# Patient Record
Sex: FEMALE | Race: WHITE | NOT HISPANIC OR LATINO | Employment: OTHER | URBAN - METROPOLITAN AREA
[De-identification: names, ages, dates, MRNs, and addresses within clinical notes are randomized per-mention and may not be internally consistent; named-entity substitution may affect disease eponyms.]

---

## 2017-03-13 ENCOUNTER — COMMUNICATION - HEALTHEAST (OUTPATIENT)
Dept: FAMILY MEDICINE | Facility: CLINIC | Age: 67
End: 2017-03-13

## 2017-03-13 DIAGNOSIS — I10 BENIGN ESSENTIAL HYPERTENSION: ICD-10-CM

## 2017-05-24 ENCOUNTER — COMMUNICATION - HEALTHEAST (OUTPATIENT)
Dept: FAMILY MEDICINE | Facility: CLINIC | Age: 67
End: 2017-05-24

## 2017-05-24 DIAGNOSIS — I10 ESSENTIAL HYPERTENSION, BENIGN: ICD-10-CM

## 2017-05-24 DIAGNOSIS — E78.2 MIXED HYPERLIPIDEMIA: ICD-10-CM

## 2017-06-05 ENCOUNTER — AMBULATORY - HEALTHEAST (OUTPATIENT)
Dept: LAB | Facility: CLINIC | Age: 67
End: 2017-06-05

## 2017-06-05 DIAGNOSIS — I10 ESSENTIAL HYPERTENSION, BENIGN: ICD-10-CM

## 2017-06-05 DIAGNOSIS — E78.2 MIXED HYPERLIPIDEMIA: ICD-10-CM

## 2017-06-05 LAB
CHOLEST SERPL-MCNC: 242 MG/DL
FASTING STATUS PATIENT QL REPORTED: YES
HDLC SERPL-MCNC: 52 MG/DL
LDLC SERPL CALC-MCNC: 163 MG/DL
TRIGL SERPL-MCNC: 134 MG/DL

## 2017-06-14 ENCOUNTER — COMMUNICATION - HEALTHEAST (OUTPATIENT)
Dept: FAMILY MEDICINE | Facility: CLINIC | Age: 67
End: 2017-06-14

## 2017-06-14 ENCOUNTER — OFFICE VISIT - HEALTHEAST (OUTPATIENT)
Dept: FAMILY MEDICINE | Facility: CLINIC | Age: 67
End: 2017-06-14

## 2017-06-14 DIAGNOSIS — I10 BENIGN ESSENTIAL HYPERTENSION: ICD-10-CM

## 2017-06-14 DIAGNOSIS — Z12.31 VISIT FOR SCREENING MAMMOGRAM: ICD-10-CM

## 2017-06-14 DIAGNOSIS — E78.2 MIXED HYPERLIPIDEMIA: ICD-10-CM

## 2017-06-14 DIAGNOSIS — Z00.00 ROUTINE GENERAL MEDICAL EXAMINATION AT A HEALTH CARE FACILITY: ICD-10-CM

## 2017-06-14 RX ORDER — ESTRADIOL AND NORETHINDRONE ACETATE 1; .5 MG/1; MG/1
0.5 TABLET ORAL AT BEDTIME
Status: SHIPPED | COMMUNITY
Start: 2017-06-14

## 2017-06-14 ASSESSMENT — MIFFLIN-ST. JEOR: SCORE: 1224.61

## 2017-08-21 ENCOUNTER — RECORDS - HEALTHEAST (OUTPATIENT)
Dept: ADMINISTRATIVE | Facility: OTHER | Age: 67
End: 2017-08-21

## 2018-06-05 ENCOUNTER — COMMUNICATION - HEALTHEAST (OUTPATIENT)
Dept: FAMILY MEDICINE | Facility: CLINIC | Age: 68
End: 2018-06-05

## 2018-06-05 DIAGNOSIS — I10 ESSENTIAL HYPERTENSION, BENIGN: ICD-10-CM

## 2018-06-05 DIAGNOSIS — E78.2 MIXED HYPERLIPIDEMIA: ICD-10-CM

## 2018-07-18 ENCOUNTER — COMMUNICATION - HEALTHEAST (OUTPATIENT)
Dept: FAMILY MEDICINE | Facility: CLINIC | Age: 68
End: 2018-07-18

## 2018-08-10 ENCOUNTER — OFFICE VISIT - HEALTHEAST (OUTPATIENT)
Dept: FAMILY MEDICINE | Facility: CLINIC | Age: 68
End: 2018-08-10

## 2018-08-10 DIAGNOSIS — N95.9 MENOPAUSAL AND PERIMENOPAUSAL DISORDER: ICD-10-CM

## 2018-08-10 DIAGNOSIS — M19.041 PRIMARY OSTEOARTHRITIS OF BOTH HANDS: ICD-10-CM

## 2018-08-10 DIAGNOSIS — E78.2 MIXED HYPERLIPIDEMIA: ICD-10-CM

## 2018-08-10 DIAGNOSIS — M19.042 PRIMARY OSTEOARTHRITIS OF BOTH HANDS: ICD-10-CM

## 2018-08-10 DIAGNOSIS — L72.3 SEBACEOUS CYST: ICD-10-CM

## 2018-08-10 DIAGNOSIS — Z00.00 ROUTINE GENERAL MEDICAL EXAMINATION AT A HEALTH CARE FACILITY: ICD-10-CM

## 2018-08-10 DIAGNOSIS — Z12.31 VISIT FOR SCREENING MAMMOGRAM: ICD-10-CM

## 2018-08-10 LAB
ALBUMIN SERPL-MCNC: 4.2 G/DL (ref 3.5–5)
ALP SERPL-CCNC: 61 U/L (ref 45–120)
ALT SERPL W P-5'-P-CCNC: <9 U/L (ref 0–45)
ANION GAP SERPL CALCULATED.3IONS-SCNC: 11 MMOL/L (ref 5–18)
AST SERPL W P-5'-P-CCNC: 15 U/L (ref 0–40)
BILIRUB SERPL-MCNC: 0.8 MG/DL (ref 0–1)
BUN SERPL-MCNC: 13 MG/DL (ref 8–28)
CALCIUM SERPL-MCNC: 10 MG/DL (ref 8.5–10.5)
CHLORIDE BLD-SCNC: 107 MMOL/L (ref 98–107)
CHOLEST SERPL-MCNC: 251 MG/DL
CO2 SERPL-SCNC: 23 MMOL/L (ref 22–31)
CREAT SERPL-MCNC: 0.79 MG/DL (ref 0.6–1.1)
FASTING STATUS PATIENT QL REPORTED: YES
GFR SERPL CREATININE-BSD FRML MDRD: >60 ML/MIN/1.73M2
GLUCOSE BLD-MCNC: 86 MG/DL (ref 70–125)
HDLC SERPL-MCNC: 60 MG/DL
LDLC SERPL CALC-MCNC: 166 MG/DL
POTASSIUM BLD-SCNC: 4.9 MMOL/L (ref 3.5–5)
PROT SERPL-MCNC: 6.8 G/DL (ref 6–8)
SODIUM SERPL-SCNC: 141 MMOL/L (ref 136–145)
TRIGL SERPL-MCNC: 126 MG/DL

## 2018-08-10 ASSESSMENT — MIFFLIN-ST. JEOR: SCORE: 1177.09

## 2018-08-24 ENCOUNTER — RECORDS - HEALTHEAST (OUTPATIENT)
Dept: ADMINISTRATIVE | Facility: OTHER | Age: 68
End: 2018-08-24

## 2018-08-28 ENCOUNTER — RECORDS - HEALTHEAST (OUTPATIENT)
Dept: ADMINISTRATIVE | Facility: OTHER | Age: 68
End: 2018-08-28

## 2018-08-28 ENCOUNTER — RECORDS - HEALTHEAST (OUTPATIENT)
Dept: BONE DENSITY | Facility: CLINIC | Age: 68
End: 2018-08-28

## 2018-08-28 DIAGNOSIS — N95.9 UNSPECIFIED MENOPAUSAL AND PERIMENOPAUSAL DISORDER: ICD-10-CM

## 2018-09-18 ENCOUNTER — RECORDS - HEALTHEAST (OUTPATIENT)
Dept: ADMINISTRATIVE | Facility: OTHER | Age: 68
End: 2018-09-18

## 2019-09-09 ENCOUNTER — COMMUNICATION - HEALTHEAST (OUTPATIENT)
Dept: FAMILY MEDICINE | Facility: CLINIC | Age: 69
End: 2019-09-09

## 2019-09-09 ENCOUNTER — OFFICE VISIT - HEALTHEAST (OUTPATIENT)
Dept: FAMILY MEDICINE | Facility: CLINIC | Age: 69
End: 2019-09-09

## 2019-09-09 DIAGNOSIS — G47.09 OTHER INSOMNIA: ICD-10-CM

## 2019-09-09 DIAGNOSIS — Z87.891 PERSONAL HISTORY OF NICOTINE DEPENDENCE: ICD-10-CM

## 2019-09-09 DIAGNOSIS — Z72.0 TOBACCO USE: ICD-10-CM

## 2019-09-09 DIAGNOSIS — E78.2 MIXED HYPERLIPIDEMIA: ICD-10-CM

## 2019-09-09 DIAGNOSIS — Z12.31 VISIT FOR SCREENING MAMMOGRAM: ICD-10-CM

## 2019-09-09 DIAGNOSIS — Z00.00 ROUTINE GENERAL MEDICAL EXAMINATION AT A HEALTH CARE FACILITY: ICD-10-CM

## 2019-09-09 DIAGNOSIS — Z13.1 SCREENING FOR DIABETES MELLITUS: ICD-10-CM

## 2019-09-09 LAB
CHOLEST SERPL-MCNC: 280 MG/DL
FASTING STATUS PATIENT QL REPORTED: YES
FASTING STATUS PATIENT QL REPORTED: YES
GLUCOSE BLD-MCNC: 91 MG/DL (ref 70–99)
HDLC SERPL-MCNC: 61 MG/DL
LDLC SERPL CALC-MCNC: 174 MG/DL
TRIGL SERPL-MCNC: 225 MG/DL

## 2019-09-09 ASSESSMENT — MIFFLIN-ST. JEOR: SCORE: 1220.49

## 2019-09-12 ENCOUNTER — COMMUNICATION - HEALTHEAST (OUTPATIENT)
Dept: FAMILY MEDICINE | Facility: CLINIC | Age: 69
End: 2019-09-12

## 2019-09-13 ENCOUNTER — COMMUNICATION - HEALTHEAST (OUTPATIENT)
Dept: FAMILY MEDICINE | Facility: CLINIC | Age: 69
End: 2019-09-13

## 2019-09-17 ENCOUNTER — HOSPITAL ENCOUNTER (OUTPATIENT)
Dept: CT IMAGING | Facility: HOSPITAL | Age: 69
Discharge: HOME | End: 2019-09-17
Attending: FAMILY MEDICINE

## 2019-09-17 DIAGNOSIS — Z72.0 TOBACCO USE: ICD-10-CM

## 2019-09-17 DIAGNOSIS — Z87.891 PERSONAL HISTORY OF NICOTINE DEPENDENCE: ICD-10-CM

## 2019-09-18 ENCOUNTER — COMMUNICATION - HEALTHEAST (OUTPATIENT)
Dept: FAMILY MEDICINE | Facility: CLINIC | Age: 69
End: 2019-09-18

## 2019-09-18 DIAGNOSIS — R91.8 ABNORMAL CT LUNG SCREENING: ICD-10-CM

## 2019-09-19 ENCOUNTER — AMBULATORY - HEALTHEAST (OUTPATIENT)
Dept: FAMILY MEDICINE | Facility: CLINIC | Age: 69
End: 2019-09-19

## 2019-09-19 ENCOUNTER — COMMUNICATION - HEALTHEAST (OUTPATIENT)
Dept: PULMONOLOGY | Facility: OTHER | Age: 69
End: 2019-09-19

## 2019-09-19 ENCOUNTER — AMBULATORY - HEALTHEAST (OUTPATIENT)
Dept: PULMONOLOGY | Facility: OTHER | Age: 69
End: 2019-09-19

## 2019-09-19 DIAGNOSIS — R91.8 PULMONARY NODULES: ICD-10-CM

## 2019-09-20 ENCOUNTER — AMBULATORY - HEALTHEAST (OUTPATIENT)
Dept: FAMILY MEDICINE | Facility: CLINIC | Age: 69
End: 2019-09-20

## 2019-09-20 DIAGNOSIS — R91.8 ABNORMAL CT LUNG SCREENING: ICD-10-CM

## 2019-09-23 ENCOUNTER — AMBULATORY - HEALTHEAST (OUTPATIENT)
Dept: PULMONOLOGY | Facility: OTHER | Age: 69
End: 2019-09-23

## 2019-09-23 ENCOUNTER — OFFICE VISIT - HEALTHEAST (OUTPATIENT)
Dept: PULMONOLOGY | Facility: OTHER | Age: 69
End: 2019-09-23

## 2019-09-23 DIAGNOSIS — F43.22 ADJUSTMENT DISORDER WITH ANXIOUS MOOD: ICD-10-CM

## 2019-09-23 DIAGNOSIS — F41.9 ANXIETY: ICD-10-CM

## 2019-09-23 ASSESSMENT — MIFFLIN-ST. JEOR: SCORE: 1218.49

## 2019-10-10 ENCOUNTER — HOSPITAL ENCOUNTER (OUTPATIENT)
Dept: PET IMAGING | Facility: HOSPITAL | Age: 69
Discharge: HOME | End: 2019-10-10
Attending: INTERNAL MEDICINE

## 2019-10-10 ENCOUNTER — AMBULATORY - HEALTHEAST (OUTPATIENT)
Dept: PULMONOLOGY | Facility: OTHER | Age: 69
End: 2019-10-10

## 2019-10-10 DIAGNOSIS — R91.8 ABNORMAL CT LUNG SCREENING: ICD-10-CM

## 2019-10-10 DIAGNOSIS — R91.8 PULMONARY NODULES: ICD-10-CM

## 2019-10-10 LAB — GLUCOSE BLDC GLUCOMTR-MCNC: 107 MG/DL (ref 70–139)

## 2019-10-14 ENCOUNTER — AMBULATORY - HEALTHEAST (OUTPATIENT)
Dept: PULMONOLOGY | Facility: OTHER | Age: 69
End: 2019-10-14

## 2019-10-14 ENCOUNTER — COMMUNICATION - HEALTHEAST (OUTPATIENT)
Dept: FAMILY MEDICINE | Facility: CLINIC | Age: 69
End: 2019-10-14

## 2019-10-14 DIAGNOSIS — R91.8 PULMONARY NODULES: ICD-10-CM

## 2019-10-24 ENCOUNTER — OFFICE VISIT - HEALTHEAST (OUTPATIENT)
Dept: FAMILY MEDICINE | Facility: CLINIC | Age: 69
End: 2019-10-24

## 2019-10-24 DIAGNOSIS — R91.8 PULMONARY NODULES: ICD-10-CM

## 2019-10-24 DIAGNOSIS — R94.31 NONSPECIFIC ABNORMAL ELECTROCARDIOGRAM (ECG) (EKG): ICD-10-CM

## 2019-10-24 DIAGNOSIS — Z01.818 PREOPERATIVE EXAMINATION: ICD-10-CM

## 2019-10-24 DIAGNOSIS — E78.5 HYPERLIPIDEMIA LDL GOAL <130: ICD-10-CM

## 2019-10-24 LAB
ALBUMIN SERPL-MCNC: 3.9 G/DL (ref 3.5–5)
ALP SERPL-CCNC: 71 U/L (ref 45–120)
ALT SERPL W P-5'-P-CCNC: <9 U/L (ref 0–45)
ANION GAP SERPL CALCULATED.3IONS-SCNC: 7 MMOL/L (ref 5–18)
AST SERPL W P-5'-P-CCNC: 13 U/L (ref 0–40)
BASOPHILS # BLD AUTO: 0 THOU/UL (ref 0–0.2)
BASOPHILS NFR BLD AUTO: 0 % (ref 0–2)
BILIRUB SERPL-MCNC: 0.5 MG/DL (ref 0–1)
BUN SERPL-MCNC: 12 MG/DL (ref 8–28)
CALCIUM SERPL-MCNC: 10.1 MG/DL (ref 8.5–10.5)
CHLORIDE BLD-SCNC: 107 MMOL/L (ref 98–107)
CO2 SERPL-SCNC: 28 MMOL/L (ref 22–31)
CREAT SERPL-MCNC: 0.79 MG/DL (ref 0.6–1.1)
EOSINOPHIL # BLD AUTO: 0.3 THOU/UL (ref 0–0.4)
EOSINOPHIL NFR BLD AUTO: 3 % (ref 0–6)
ERYTHROCYTE [DISTWIDTH] IN BLOOD BY AUTOMATED COUNT: 13.4 % (ref 11–14.5)
GFR SERPL CREATININE-BSD FRML MDRD: >60 ML/MIN/1.73M2
GLUCOSE BLD-MCNC: 104 MG/DL (ref 70–125)
HCT VFR BLD AUTO: 44.8 % (ref 35–47)
HGB BLD-MCNC: 14.8 G/DL (ref 12–16)
LYMPHOCYTES # BLD AUTO: 2.9 THOU/UL (ref 0.8–4.4)
LYMPHOCYTES NFR BLD AUTO: 28 % (ref 20–40)
MCH RBC QN AUTO: 31.4 PG (ref 27–34)
MCHC RBC AUTO-ENTMCNC: 33 G/DL (ref 32–36)
MCV RBC AUTO: 95 FL (ref 80–100)
MONOCYTES # BLD AUTO: 0.7 THOU/UL (ref 0–0.9)
MONOCYTES NFR BLD AUTO: 7 % (ref 2–10)
NEUTROPHILS # BLD AUTO: 6.2 THOU/UL (ref 2–7.7)
NEUTROPHILS NFR BLD AUTO: 61 % (ref 50–70)
PLATELET # BLD AUTO: 211 THOU/UL (ref 140–440)
PMV BLD AUTO: 11.6 FL (ref 8.5–12.5)
POTASSIUM BLD-SCNC: 4.5 MMOL/L (ref 3.5–5)
PROT SERPL-MCNC: 6.6 G/DL (ref 6–8)
RBC # BLD AUTO: 4.71 MILL/UL (ref 3.8–5.4)
SODIUM SERPL-SCNC: 142 MMOL/L (ref 136–145)
WBC: 10.1 THOU/UL (ref 4–11)

## 2019-10-24 ASSESSMENT — MIFFLIN-ST. JEOR: SCORE: 1225.29

## 2019-10-25 ENCOUNTER — COMMUNICATION - HEALTHEAST (OUTPATIENT)
Dept: FAMILY MEDICINE | Facility: CLINIC | Age: 69
End: 2019-10-25

## 2019-10-25 ENCOUNTER — HOSPITAL ENCOUNTER (OUTPATIENT)
Dept: CARDIOLOGY | Facility: CLINIC | Age: 69
Discharge: HOME | End: 2019-10-25
Attending: FAMILY MEDICINE

## 2019-10-25 DIAGNOSIS — R94.31 NONSPECIFIC ABNORMAL ELECTROCARDIOGRAM (ECG) (EKG): ICD-10-CM

## 2019-10-25 LAB
ATRIAL RATE - MUSE: 59 BPM
CV STRESS CURRENT BP HE: NORMAL
CV STRESS CURRENT HR HE: 103
CV STRESS CURRENT HR HE: 103
CV STRESS CURRENT HR HE: 105
CV STRESS CURRENT HR HE: 106
CV STRESS CURRENT HR HE: 107
CV STRESS CURRENT HR HE: 114
CV STRESS CURRENT HR HE: 115
CV STRESS CURRENT HR HE: 119
CV STRESS CURRENT HR HE: 121
CV STRESS CURRENT HR HE: 129
CV STRESS CURRENT HR HE: 129
CV STRESS CURRENT HR HE: 70
CV STRESS CURRENT HR HE: 70
CV STRESS CURRENT HR HE: 71
CV STRESS CURRENT HR HE: 72
CV STRESS CURRENT HR HE: 72
CV STRESS CURRENT HR HE: 73
CV STRESS CURRENT HR HE: 74
CV STRESS CURRENT HR HE: 77
CV STRESS CURRENT HR HE: 81
CV STRESS CURRENT HR HE: 83
CV STRESS CURRENT HR HE: 85
CV STRESS CURRENT HR HE: 92
CV STRESS CURRENT HR HE: 92
CV STRESS CURRENT HR HE: 99
CV STRESS DEVIATION TIME HE: NORMAL
CV STRESS ECHO PERCENT HR HE: NORMAL
CV STRESS EXERCISE STAGE HE: NORMAL
CV STRESS FINAL RESTING BP HE: NORMAL
CV STRESS FINAL RESTING HR HE: 74
CV STRESS MAX HR HE: 129
CV STRESS MAX TREADMILL GRADE HE: 14
CV STRESS MAX TREADMILL SPEED HE: 3.4
CV STRESS PEAK DIA BP HE: NORMAL
CV STRESS PEAK SYS BP HE: NORMAL
CV STRESS PHASE HE: NORMAL
CV STRESS PROTOCOL HE: NORMAL
CV STRESS RESTING PT POSITION HE: NORMAL
CV STRESS ST DEVIATION AMOUNT HE: NORMAL
CV STRESS ST DEVIATION ELEVATION HE: NORMAL
CV STRESS ST EVELATION AMOUNT HE: NORMAL
CV STRESS TEST TYPE HE: NORMAL
CV STRESS TOTAL STAGE TIME MIN 1 HE: NORMAL
DIASTOLIC BLOOD PRESSURE - MUSE: NORMAL
ECHO EJECTION FRACTION ESTIMATED: 65 %
INTERPRETATION ECG - MUSE: NORMAL
P AXIS - MUSE: 75 DEGREES
PR INTERVAL - MUSE: 178 MS
QRS DURATION - MUSE: 80 MS
QT - MUSE: 428 MS
QTC - MUSE: 423 MS
R AXIS - MUSE: 26 DEGREES
RATE PRESSURE PRODUCT: NORMAL
STRESS ECHO BASELINE BP: NORMAL
STRESS ECHO BASELINE DIASTOLIC HE: 70
STRESS ECHO BASELINE HR: 81
STRESS ECHO BASELINE SYSTOLIC BP: 124
STRESS ECHO CALCULATED PERCENT HR: 87 %
STRESS ECHO LAST STRESS BP: NORMAL
STRESS ECHO LAST STRESS DIASTOLIC BP: 70
STRESS ECHO LAST STRESS HR: 129
STRESS ECHO LAST STRESS SYSTOLIC BP: 192
STRESS ECHO POST ESTIMATED WORKLOAD: 8.7
STRESS ECHO POST EXERCISE DUR MIN: 7
STRESS ECHO POST EXERCISE DUR SEC: 0
STRESS ECHO TARGET HR: 149
SYSTOLIC BLOOD PRESSURE - MUSE: NORMAL
T AXIS - MUSE: 211 DEGREES
VENTRICULAR RATE- MUSE: 59 BPM

## 2019-10-28 ENCOUNTER — HOSPITAL ENCOUNTER (OUTPATIENT)
Dept: RADIOLOGY | Facility: CLINIC | Age: 69
Discharge: HOME | End: 2019-10-28
Attending: RADIOLOGY

## 2019-10-28 ENCOUNTER — HOSPITAL ENCOUNTER (OUTPATIENT)
Dept: CT IMAGING | Facility: CLINIC | Age: 69
Discharge: HOME | End: 2019-10-28
Attending: INTERNAL MEDICINE | Admitting: RADIOLOGY

## 2019-10-28 DIAGNOSIS — R91.8 PULMONARY NODULES: ICD-10-CM

## 2019-10-28 LAB — INR PPP: 1.01 (ref 0.9–1.1)

## 2019-10-28 ASSESSMENT — MIFFLIN-ST. JEOR: SCORE: 1218.25

## 2019-10-30 LAB
CAP COMMENT: ABNORMAL
LAB AP CHARGES (HE HISTORICAL CONVERSION): ABNORMAL
LAB AP INITIAL CYTO EVAL (HE HISTORICAL CONVERSION): ABNORMAL
LAB MED GENERAL PATH INTERP (HE HISTORICAL CONVERSION): ABNORMAL
PATH REPORT.COMMENTS IMP SPEC: ABNORMAL
PATH REPORT.COMMENTS IMP SPEC: ABNORMAL
PATH REPORT.FINAL DX SPEC: ABNORMAL
PATH REPORT.MICROSCOPIC SPEC OTHER STN: ABNORMAL
PATH REPORT.RELEVANT HX SPEC: ABNORMAL
SPECIMEN DESCRIPTION: ABNORMAL

## 2019-11-04 ENCOUNTER — AMBULATORY - HEALTHEAST (OUTPATIENT)
Dept: PULMONOLOGY | Facility: OTHER | Age: 69
End: 2019-11-04

## 2019-11-04 DIAGNOSIS — C34.31 MALIGNANT NEOPLASM OF LOWER LOBE OF RIGHT LUNG (H): ICD-10-CM

## 2019-11-06 ENCOUNTER — HOSPITAL ENCOUNTER (OUTPATIENT)
Dept: RESPIRATORY THERAPY | Facility: HOSPITAL | Age: 69
Discharge: HOME | End: 2019-11-06
Attending: INTERNAL MEDICINE

## 2019-11-06 DIAGNOSIS — C34.31 MALIGNANT NEOPLASM OF LOWER LOBE OF RIGHT LUNG (H): ICD-10-CM

## 2019-11-07 ENCOUNTER — OFFICE VISIT - HEALTHEAST (OUTPATIENT)
Dept: PULMONOLOGY | Facility: OTHER | Age: 69
End: 2019-11-07

## 2019-11-07 ENCOUNTER — SURGERY - HEALTHEAST (OUTPATIENT)
Dept: SURGERY | Facility: CLINIC | Age: 69
End: 2019-11-07

## 2019-11-07 DIAGNOSIS — C34.31 MALIGNANT NEOPLASM OF LOWER LOBE OF RIGHT LUNG (H): ICD-10-CM

## 2019-11-07 DIAGNOSIS — R91.1 LUNG NODULE: ICD-10-CM

## 2019-11-07 ASSESSMENT — MIFFLIN-ST. JEOR: SCORE: 1229.14

## 2019-11-08 ENCOUNTER — PREP FOR PROCEDURE (OUTPATIENT)
Dept: SURGERY | Facility: CLINIC | Age: 69
End: 2019-11-08

## 2019-11-08 DIAGNOSIS — C34.31 MALIGNANT NEOPLASM OF LOWER LOBE OF RIGHT LUNG (H): Primary | ICD-10-CM

## 2019-11-12 ENCOUNTER — COMMUNICATION - HEALTHEAST (OUTPATIENT)
Dept: FAMILY MEDICINE | Facility: CLINIC | Age: 69
End: 2019-11-12

## 2019-11-12 ENCOUNTER — TELEPHONE (OUTPATIENT)
Dept: SURGERY | Facility: CLINIC | Age: 69
End: 2019-11-12

## 2019-11-12 NOTE — TELEPHONE ENCOUNTER
Called patient to schedule surgery with Dr. Dumas,   Patient stated she will keep the surgery as scheduled in [facility].   Message was sent to her care team

## 2019-11-15 ENCOUNTER — SURGERY - HEALTHEAST (OUTPATIENT)
Dept: CARDIOLOGY | Facility: CLINIC | Age: 69
End: 2019-11-15

## 2019-11-25 ENCOUNTER — OFFICE VISIT - HEALTHEAST (OUTPATIENT)
Dept: PULMONOLOGY | Facility: OTHER | Age: 69
End: 2019-11-25

## 2019-11-25 DIAGNOSIS — C34.31 MALIGNANT NEOPLASM OF LOWER LOBE OF RIGHT LUNG (H): ICD-10-CM

## 2019-11-26 ENCOUNTER — OFFICE VISIT - HEALTHEAST (OUTPATIENT)
Dept: FAMILY MEDICINE | Facility: CLINIC | Age: 69
End: 2019-11-26

## 2019-11-26 DIAGNOSIS — C34.31 MALIGNANT NEOPLASM OF LOWER LOBE OF RIGHT LUNG (H): ICD-10-CM

## 2019-11-26 DIAGNOSIS — I10 HYPERTENSION, UNSPECIFIED TYPE: ICD-10-CM

## 2019-11-26 DIAGNOSIS — Z01.818 PRE-OPERATIVE GENERAL PHYSICAL EXAMINATION: ICD-10-CM

## 2019-11-26 LAB
ANION GAP SERPL CALCULATED.3IONS-SCNC: 8 MMOL/L (ref 5–18)
BUN SERPL-MCNC: 11 MG/DL (ref 8–28)
CALCIUM SERPL-MCNC: 9.3 MG/DL (ref 8.5–10.5)
CHLORIDE BLD-SCNC: 106 MMOL/L (ref 98–107)
CO2 SERPL-SCNC: 26 MMOL/L (ref 22–31)
CREAT SERPL-MCNC: 0.81 MG/DL (ref 0.6–1.1)
ERYTHROCYTE [DISTWIDTH] IN BLOOD BY AUTOMATED COUNT: 11.9 % (ref 11–14.5)
GFR SERPL CREATININE-BSD FRML MDRD: >60 ML/MIN/1.73M2
GLUCOSE BLD-MCNC: 92 MG/DL (ref 70–125)
HCT VFR BLD AUTO: 44.5 % (ref 35–47)
HGB BLD-MCNC: 15 G/DL (ref 12–16)
MCH RBC QN AUTO: 31.8 PG (ref 27–34)
MCHC RBC AUTO-ENTMCNC: 33.8 G/DL (ref 32–36)
MCV RBC AUTO: 94 FL (ref 80–100)
PLATELET # BLD AUTO: 176 THOU/UL (ref 140–440)
PMV BLD AUTO: 8.3 FL (ref 7–10)
POTASSIUM BLD-SCNC: 4 MMOL/L (ref 3.5–5)
RBC # BLD AUTO: 4.72 MILL/UL (ref 3.8–5.4)
SODIUM SERPL-SCNC: 140 MMOL/L (ref 136–145)
WBC: 9.4 THOU/UL (ref 4–11)

## 2019-11-26 ASSESSMENT — MIFFLIN-ST. JEOR: SCORE: 1232.66

## 2019-11-27 ENCOUNTER — COMMUNICATION - HEALTHEAST (OUTPATIENT)
Dept: FAMILY MEDICINE | Facility: CLINIC | Age: 69
End: 2019-11-27

## 2019-12-10 ENCOUNTER — SURGERY - HEALTHEAST (OUTPATIENT)
Dept: SURGERY | Facility: CLINIC | Age: 69
End: 2019-12-10

## 2019-12-10 ENCOUNTER — ANESTHESIA - HEALTHEAST (OUTPATIENT)
Dept: SURGERY | Facility: CLINIC | Age: 69
End: 2019-12-10

## 2019-12-10 ASSESSMENT — MIFFLIN-ST. JEOR: SCORE: 1220.75

## 2019-12-11 ASSESSMENT — MIFFLIN-ST. JEOR: SCORE: 1246.61

## 2019-12-12 ASSESSMENT — MIFFLIN-ST. JEOR
SCORE: 1246.5
SCORE: 1246.61

## 2019-12-13 ENCOUNTER — AMBULATORY - HEALTHEAST (OUTPATIENT)
Dept: PULMONOLOGY | Facility: OTHER | Age: 69
End: 2019-12-13

## 2019-12-13 DIAGNOSIS — C34.31 MALIGNANT NEOPLASM OF LOWER LOBE OF RIGHT LUNG (H): ICD-10-CM

## 2019-12-13 ASSESSMENT — MIFFLIN-ST. JEOR: SCORE: 1240.71

## 2019-12-14 ASSESSMENT — MIFFLIN-ST. JEOR: SCORE: 1233

## 2019-12-15 ASSESSMENT — MIFFLIN-ST. JEOR: SCORE: 1228.01

## 2019-12-16 ENCOUNTER — COMMUNICATION - HEALTHEAST (OUTPATIENT)
Dept: FAMILY MEDICINE | Facility: CLINIC | Age: 69
End: 2019-12-16

## 2019-12-16 ASSESSMENT — MIFFLIN-ST. JEOR: SCORE: 1223.47

## 2019-12-19 ENCOUNTER — OFFICE VISIT - HEALTHEAST (OUTPATIENT)
Dept: FAMILY MEDICINE | Facility: CLINIC | Age: 69
End: 2019-12-19

## 2019-12-19 ENCOUNTER — COMMUNICATION - HEALTHEAST (OUTPATIENT)
Dept: FAMILY MEDICINE | Facility: CLINIC | Age: 69
End: 2019-12-19

## 2019-12-19 DIAGNOSIS — I10 HYPERTENSION, UNSPECIFIED TYPE: ICD-10-CM

## 2019-12-19 DIAGNOSIS — Z98.890 S/P THORACOTOMY: ICD-10-CM

## 2019-12-19 ASSESSMENT — MIFFLIN-ST. JEOR: SCORE: 1202.61

## 2019-12-23 ENCOUNTER — OFFICE VISIT - HEALTHEAST (OUTPATIENT)
Dept: PULMONOLOGY | Facility: OTHER | Age: 69
End: 2019-12-23

## 2019-12-23 DIAGNOSIS — B40.9 BLASTOMYCOSIS: ICD-10-CM

## 2019-12-23 ASSESSMENT — MIFFLIN-ST. JEOR: SCORE: 1202.61

## 2020-01-16 ENCOUNTER — HOSPITAL ENCOUNTER (OUTPATIENT)
Dept: RADIOLOGY | Facility: HOSPITAL | Age: 70
Discharge: HOME | End: 2020-01-16
Attending: THORACIC SURGERY (CARDIOTHORACIC VASCULAR SURGERY)

## 2020-01-16 ENCOUNTER — OFFICE VISIT - HEALTHEAST (OUTPATIENT)
Dept: PULMONOLOGY | Facility: OTHER | Age: 70
End: 2020-01-16

## 2020-01-16 ENCOUNTER — COMMUNICATION - HEALTHEAST (OUTPATIENT)
Dept: FAMILY MEDICINE | Facility: CLINIC | Age: 70
End: 2020-01-16

## 2020-01-16 DIAGNOSIS — C34.31 MALIGNANT NEOPLASM OF LOWER LOBE OF RIGHT LUNG (H): ICD-10-CM

## 2020-01-16 DIAGNOSIS — J84.10 PULMONARY GRANULOMA (H): ICD-10-CM

## 2020-01-16 DIAGNOSIS — R91.8 PULMONARY NODULES: ICD-10-CM

## 2020-01-16 DIAGNOSIS — E78.5 HYPERLIPIDEMIA LDL GOAL <130: ICD-10-CM

## 2020-01-16 RX ORDER — METOPROLOL TARTRATE 25 MG/1
25 TABLET, FILM COATED ORAL 2 TIMES DAILY
Qty: 14 TABLET | Refills: 0 | Status: SHIPPED | OUTPATIENT
Start: 2020-01-16 | End: 2021-10-29

## 2020-01-29 ENCOUNTER — HOSPITAL ENCOUNTER (OUTPATIENT)
Dept: CT IMAGING | Facility: HOSPITAL | Age: 70
Discharge: HOME | End: 2020-01-29
Attending: INTERNAL MEDICINE

## 2020-01-29 DIAGNOSIS — B40.9 BLASTOMYCOSIS: ICD-10-CM

## 2020-02-04 ENCOUNTER — COMMUNICATION - HEALTHEAST (OUTPATIENT)
Dept: FAMILY MEDICINE | Facility: CLINIC | Age: 70
End: 2020-02-04

## 2020-02-04 DIAGNOSIS — G47.09 OTHER INSOMNIA: ICD-10-CM

## 2020-02-10 ENCOUNTER — OFFICE VISIT - HEALTHEAST (OUTPATIENT)
Dept: PULMONOLOGY | Facility: OTHER | Age: 70
End: 2020-02-10

## 2020-02-10 DIAGNOSIS — R05.9 COUGH: ICD-10-CM

## 2020-02-11 ENCOUNTER — COMMUNICATION - HEALTHEAST (OUTPATIENT)
Dept: SCHEDULING | Facility: CLINIC | Age: 70
End: 2020-02-11

## 2020-02-11 ENCOUNTER — COMMUNICATION - HEALTHEAST (OUTPATIENT)
Dept: FAMILY MEDICINE | Facility: CLINIC | Age: 70
End: 2020-02-11

## 2020-02-11 DIAGNOSIS — G47.09 OTHER INSOMNIA: ICD-10-CM

## 2020-02-19 ENCOUNTER — AMBULATORY - HEALTHEAST (OUTPATIENT)
Dept: PULMONOLOGY | Facility: OTHER | Age: 70
End: 2020-02-19

## 2020-02-19 ENCOUNTER — COMMUNICATION - HEALTHEAST (OUTPATIENT)
Dept: PULMONOLOGY | Facility: OTHER | Age: 70
End: 2020-02-19

## 2020-02-19 DIAGNOSIS — R05.9 COUGH: ICD-10-CM

## 2020-02-20 ENCOUNTER — HOSPITAL ENCOUNTER (OUTPATIENT)
Dept: RADIOLOGY | Facility: HOSPITAL | Age: 70
Discharge: HOME | End: 2020-02-20
Attending: INTERNAL MEDICINE

## 2020-02-20 ENCOUNTER — OFFICE VISIT - HEALTHEAST (OUTPATIENT)
Dept: PULMONOLOGY | Facility: OTHER | Age: 70
End: 2020-02-20

## 2020-02-20 DIAGNOSIS — R05.9 COUGH: ICD-10-CM

## 2020-02-20 DIAGNOSIS — J40 BRONCHITIS: ICD-10-CM

## 2020-03-05 ENCOUNTER — AMBULATORY - HEALTHEAST (OUTPATIENT)
Dept: PULMONOLOGY | Facility: OTHER | Age: 70
End: 2020-03-05

## 2020-03-05 ENCOUNTER — COMMUNICATION - HEALTHEAST (OUTPATIENT)
Dept: PULMONOLOGY | Facility: OTHER | Age: 70
End: 2020-03-05

## 2020-03-05 ENCOUNTER — COMMUNICATION - HEALTHEAST (OUTPATIENT)
Dept: SCHEDULING | Facility: CLINIC | Age: 70
End: 2020-03-05

## 2020-03-05 DIAGNOSIS — R05.9 COUGH: ICD-10-CM

## 2020-04-01 ENCOUNTER — COMMUNICATION - HEALTHEAST (OUTPATIENT)
Dept: FAMILY MEDICINE | Facility: CLINIC | Age: 70
End: 2020-04-01

## 2020-04-01 DIAGNOSIS — I10 HYPERTENSION, UNSPECIFIED TYPE: ICD-10-CM

## 2020-04-03 ENCOUNTER — COMMUNICATION - HEALTHEAST (OUTPATIENT)
Dept: FAMILY MEDICINE | Facility: CLINIC | Age: 70
End: 2020-04-03

## 2020-04-06 ENCOUNTER — AMBULATORY - HEALTHEAST (OUTPATIENT)
Dept: FAMILY MEDICINE | Facility: CLINIC | Age: 70
End: 2020-04-06

## 2020-04-06 DIAGNOSIS — I10 HYPERTENSION, UNSPECIFIED TYPE: ICD-10-CM

## 2020-04-10 ENCOUNTER — COMMUNICATION - HEALTHEAST (OUTPATIENT)
Dept: FAMILY MEDICINE | Facility: CLINIC | Age: 70
End: 2020-04-10

## 2020-04-10 DIAGNOSIS — I10 HYPERTENSION, UNSPECIFIED TYPE: ICD-10-CM

## 2020-05-13 ENCOUNTER — COMMUNICATION - HEALTHEAST (OUTPATIENT)
Dept: FAMILY MEDICINE | Facility: CLINIC | Age: 70
End: 2020-05-13

## 2020-05-13 DIAGNOSIS — I10 HYPERTENSION, UNSPECIFIED TYPE: ICD-10-CM

## 2020-05-13 RX ORDER — LISINOPRIL 20 MG/1
20 TABLET ORAL DAILY
Qty: 90 TABLET | Refills: 3 | Status: SHIPPED | OUTPATIENT
Start: 2020-05-13 | End: 2021-10-29 | Stop reason: DRUGHIGH

## 2020-05-14 ENCOUNTER — AMBULATORY - HEALTHEAST (OUTPATIENT)
Dept: LAB | Facility: CLINIC | Age: 70
End: 2020-05-14

## 2020-05-14 ENCOUNTER — COMMUNICATION - HEALTHEAST (OUTPATIENT)
Dept: FAMILY MEDICINE | Facility: CLINIC | Age: 70
End: 2020-05-14

## 2020-05-14 DIAGNOSIS — I10 HYPERTENSION, UNSPECIFIED TYPE: ICD-10-CM

## 2020-05-14 LAB
ANION GAP SERPL CALCULATED.3IONS-SCNC: 7 MMOL/L (ref 5–18)
BUN SERPL-MCNC: 12 MG/DL (ref 8–28)
CALCIUM SERPL-MCNC: 9.4 MG/DL (ref 8.5–10.5)
CHLORIDE BLD-SCNC: 107 MMOL/L (ref 98–107)
CO2 SERPL-SCNC: 27 MMOL/L (ref 22–31)
CREAT SERPL-MCNC: 0.82 MG/DL (ref 0.6–1.1)
GFR SERPL CREATININE-BSD FRML MDRD: >60 ML/MIN/1.73M2
GLUCOSE BLD-MCNC: 99 MG/DL (ref 70–125)
POTASSIUM BLD-SCNC: 4.4 MMOL/L (ref 3.5–5)
SODIUM SERPL-SCNC: 141 MMOL/L (ref 136–145)

## 2020-06-03 ENCOUNTER — COMMUNICATION - HEALTHEAST (OUTPATIENT)
Dept: FAMILY MEDICINE | Facility: CLINIC | Age: 70
End: 2020-06-03

## 2020-06-27 ENCOUNTER — COMMUNICATION - HEALTHEAST (OUTPATIENT)
Dept: FAMILY MEDICINE | Facility: CLINIC | Age: 70
End: 2020-06-27

## 2020-06-27 DIAGNOSIS — I10 HYPERTENSION, UNSPECIFIED TYPE: ICD-10-CM

## 2020-08-03 ENCOUNTER — OFFICE VISIT - HEALTHEAST (OUTPATIENT)
Dept: PULMONOLOGY | Facility: OTHER | Age: 70
End: 2020-08-03

## 2020-08-03 DIAGNOSIS — J43.2 CENTRILOBULAR EMPHYSEMA (H): ICD-10-CM

## 2020-08-24 ENCOUNTER — COMMUNICATION - HEALTHEAST (OUTPATIENT)
Dept: FAMILY MEDICINE | Facility: CLINIC | Age: 70
End: 2020-08-24

## 2020-08-24 DIAGNOSIS — G47.09 OTHER INSOMNIA: ICD-10-CM

## 2020-08-25 ENCOUNTER — RECORDS - HEALTHEAST (OUTPATIENT)
Dept: ADMINISTRATIVE | Facility: OTHER | Age: 70
End: 2020-08-25

## 2020-09-28 ENCOUNTER — AMBULATORY - HEALTHEAST (OUTPATIENT)
Dept: FAMILY MEDICINE | Facility: CLINIC | Age: 70
End: 2020-09-28

## 2020-09-28 ENCOUNTER — VIRTUAL VISIT (OUTPATIENT)
Dept: FAMILY MEDICINE | Facility: OTHER | Age: 70
End: 2020-09-28
Payer: COMMERCIAL

## 2020-09-28 DIAGNOSIS — Z20.822 SUSPECTED COVID-19 VIRUS INFECTION: ICD-10-CM

## 2020-09-28 PROCEDURE — 99421 OL DIG E/M SVC 5-10 MIN: CPT | Performed by: PHYSICIAN ASSISTANT

## 2020-09-28 NOTE — PROGRESS NOTES
"Date: 2020 12:22:01  Clinician: Diann Beltre  Clinician NPI: [id number]  Patient: Katey Singh  Patient : 1950  Patient Address: [address] [place] [address]  Patient Phone: [phone]  Visit Protocol: URI  Patient Summary:  Katey is a 70 year old ( : 1950 ) female who initiated a [facility] Visit for COVID-19 (Coronavirus) evaluation and screening. When asked the question \"Please sign me up to receive news, health information and promotions from [facility].\", Katey responded \"No\".    Katey states her symptoms started suddenly 3-4 days ago. After her symptoms started, they improved and then got worse again.   Her symptoms consist of nausea, myalgia, malaise, and diarrhea.   Katey denies having cough, nasal congestion, headache, ear pain, anosmia, vomiting, rhinitis, wheezing, enlarged lymph nodes, facial pain or pressure, fever, chills, sore throat, teeth pain, and ageusia. She also denies taking antibiotic medication in the past month and having recent facial or sinus surgery in the past 60 days. She is not experiencing dyspnea.   Precipitating events  She has not recently been exposed to someone with influenza. Katey has been in close contact with the following high risk individuals: adults 65 or older and people with asthma, heart disease or diabetes.   Pertinent COVID-19 (Coronavirus) information  In the past 14 days, Katey has not worked in a congregate living setting.   She does not work or volunteer as healthcare worker or a  and does not work or volunteer in a healthcare facility.   Katey also has not lived in a congregate living setting in the past 14 days. She does not live with a healthcare worker.   Katey has not had a close contact with a laboratory-confirmed COVID-19 patient within 14 days of symptom onset.   Since 2019, Katey and has had upper respiratory infection (URI) or influenza-like illness. Has not been diagnosed with " lab-confirmed COVID-19 test      Date(s) of previous URI or influenza-like illness (free-text): 09/25 thru 09/28/20     Symptoms Katey experienced during previous URI or influenza-like illness as reported by the patient (free-text): Diarrhea, nausea, aches with headache earlier but not now        Pertinent medical history  Katey does not get yeast infections when she takes antibiotics.   Katey does not need a return to work/school note.   Weight: 145 lbs   Katey does not smoke or use smokeless tobacco.   Weight: 145 lbs    MEDICATIONS: lisinopril-hydrochlorothiazide oral, ALLERGIES: NKDA  Clinician Response:  Dear Katey,   Your symptoms show that you may have coronavirus (COVID-19). This illness can cause fever, cough and trouble breathing. Many people get a mild case and get better on their own. Some people can get very sick.  What should I do?  We would like to test you for this virus.   1. Please call [phone] to schedule your visit. Explain that you were referred by [facility] to have a COVID-19 test. Be ready to share your [facility] visit ID number.  The following will serve as your written order for this COVID Test, ordered by me, for the indication of suspected COVID [Z20.828]: The test will be ordered in [employer], our electronic health record, after you are scheduled. It will show as ordered and authorized by Edilson Mckenna MD.  Order: COVID-19 (Coronavirus) PCR for SYMPTOMATIC testing from [facility].      2. When it's time for your COVID test:  Stay at least 6 feet away from others. (If someone will drive you to your test, stay in the backseat, as far away from the  as you can.)   Cover your mouth and nose with a mask, tissue or washcloth.  Go straight to the testing site. Don't make any stops on the way there or back.      3.Starting now: Stay home and away from others (self-isolate) until:   You've had no fever---and no medicine that reduces fever---for one full day (24 hours). And...   Your other  "symptoms have gotten better. For example, your cough or breathing has improved. And...   At least 10 days have passed since your symptoms started.       During this time, don't leave the house except for testing or medical care.   Stay in your own room, even for meals. Use your own bathroom if you can.   Stay away from others in your home. No hugging, kissing or shaking hands. No visitors.  Don't go to work, school or anywhere else.    Clean \"high touch\" surfaces often (doorknobs, counters, handles, etc.). Use a household cleaning spray or wipes. You'll find a full list of  on the EPA website: www.epa.gov/pesticide-registration/list-n-disinfectants-use-against-sars-cov-2.   Cover your mouth and nose with a mask, tissue or washcloth to avoid spreading germs.  Wash your hands and face often. Use soap and water.  Caregivers in these groups are at risk for severe illness due to COVID-19:  o People 65 years and older  o People who live in a nursing home or long-term care facility  o People with chronic disease (lung, heart, cancer, diabetes, kidney, liver, immunologic)  o People who have a weakened immune system, including those who:   Are in cancer treatment  Take medicine that weakens the immune system, such as corticosteroids  Had a bone marrow or organ transplant  Have an immune deficiency  Have poorly controlled HIV or AIDS  Are obese (body mass index of 40 or higher)  Smoke regularly   o Caregivers should wear gloves while washing dishes, handling laundry and cleaning bedrooms and bathrooms.  o Use caution when washing and drying laundry: Don't shake dirty laundry, and use the warmest water setting that you can.  o For more tips, go to www.cdc.gov/coronavirus/2019-ncov/downloads/10Things.pdf.    4.Sign up for Liz Mendoza. We know it's scary to hear that you might have COVID-19. We want to track your symptoms to make sure you're okay over the next 2 weeks. Please look for an email from Liz Mendoza---this " is a free, online program that we'll use to keep in touch. To sign up, follow the link in the email. Learn more at [email or web address]  How can I take care of myself?   Get lots of rest. Drink extra fluids (unless a doctor has told you not to).   Take Tylenol (acetaminophen) for fever or pain. If you have liver or kidney problems, ask your family doctor if it's okay to take Tylenol.   Adults can take either:    650 mg (two 325 mg pills) every 4 to 6 hours, or...   1,000 mg (two 500 mg pills) every 8 hours as needed.    Note: Don't take more than 3,000 mg in one day. Acetaminophen is found in many medicines (both prescribed and over-the-counter medicines). Read all labels to be sure you don't take too much.   For children, check the Tylenol bottle for the right dose. The dose is based on the child's age or weight.    If you have other health problems (like cancer, heart failure, an organ transplant or severe kidney disease): Call your specialty clinic if you don't feel better in the next 2 days.       Know when to call 911. Emergency warning signs include:    Trouble breathing or shortness of breath Pain or pressure in the chest that doesn't go away Feeling confused like you haven't felt before, or not being able to wake up Bluish-colored lips or face.  Where can I get more information?   [facility] -- About COVID-19: [email or web address]   CDC -- What to Do If You're Sick: www.cdc.gov/coronavirus/2019-ncov/about/steps-when-sick.html   CDC -- Ending Home Isolation: www.cdc.gov/coronavirus/2019-ncov/hcp/disposition-in-home-patients.html   CDC -- Caring for Someone: www.cdc.gov/coronavirus/2019-ncov/if-you-are-sick/care-for-someone.html   [facility] -- Interim Guidance for Hospital Discharge to Home: www.health.[facility].mn.us/diseases/coronavirus/hcp/hospdischarge.pdf   [facility] clinical trials (COVID-19 research studies): clinicalaffairs.[facility]-clinical-trials    Below are  the COVID-19 hotlines at the [place] Department of Health ([facility]). Interpreters are available.    For health questions: Call 286-148-8619 or 1-370.565.4700 (7 a.m. to 7 p.m.) For questions about schools and childcare: Call 821-268-6147 or 1-799.408.1414 (7 a.m. to 7 p.m.)    Diagnosis: Diarrhea, unspecified  Diagnosis ICD: R19.7

## 2020-09-29 ENCOUNTER — AMBULATORY - HEALTHEAST (OUTPATIENT)
Dept: FAMILY MEDICINE | Facility: CLINIC | Age: 70
End: 2020-09-29

## 2020-09-29 DIAGNOSIS — Z20.822 SUSPECTED COVID-19 VIRUS INFECTION: ICD-10-CM

## 2020-10-01 ENCOUNTER — COMMUNICATION - HEALTHEAST (OUTPATIENT)
Dept: SCHEDULING | Facility: CLINIC | Age: 70
End: 2020-10-01

## 2021-01-02 ENCOUNTER — COMMUNICATION - HEALTHEAST (OUTPATIENT)
Dept: FAMILY MEDICINE | Facility: CLINIC | Age: 71
End: 2021-01-02

## 2021-01-02 DIAGNOSIS — I10 HYPERTENSION, UNSPECIFIED TYPE: ICD-10-CM

## 2021-01-04 RX ORDER — AMLODIPINE BESYLATE 5 MG/1
TABLET ORAL
Qty: 90 TABLET | Refills: 1 | Status: SHIPPED | OUTPATIENT
Start: 2021-01-04 | End: 2021-11-07

## 2021-02-04 ENCOUNTER — COMMUNICATION - HEALTHEAST (OUTPATIENT)
Dept: FAMILY MEDICINE | Facility: CLINIC | Age: 71
End: 2021-02-04

## 2021-02-04 DIAGNOSIS — E78.5 HYPERLIPIDEMIA LDL GOAL <130: ICD-10-CM

## 2021-02-08 RX ORDER — ATORVASTATIN CALCIUM 20 MG/1
TABLET, FILM COATED ORAL
Qty: 90 TABLET | Refills: 3 | Status: SHIPPED | OUTPATIENT
Start: 2021-02-08 | End: 2022-01-31

## 2021-02-28 ENCOUNTER — COMMUNICATION - HEALTHEAST (OUTPATIENT)
Dept: FAMILY MEDICINE | Facility: CLINIC | Age: 71
End: 2021-02-28

## 2021-05-12 ENCOUNTER — RECORDS - HEALTHEAST (OUTPATIENT)
Dept: PULMONOLOGY | Facility: OTHER | Age: 71
End: 2021-05-12

## 2021-05-12 ENCOUNTER — RECORDS - HEALTHEAST (OUTPATIENT)
Dept: ADMINISTRATIVE | Facility: OTHER | Age: 71
End: 2021-05-12

## 2021-05-12 DIAGNOSIS — J43.2 CENTRILOBULAR EMPHYSEMA (H): ICD-10-CM

## 2021-05-12 LAB — HGB BLD-MCNC: 13.8 G/DL

## 2021-05-19 ENCOUNTER — AMBULATORY - HEALTHEAST (OUTPATIENT)
Dept: PULMONOLOGY | Facility: OTHER | Age: 71
End: 2021-05-19

## 2021-05-25 ENCOUNTER — OFFICE VISIT - HEALTHEAST (OUTPATIENT)
Dept: PULMONOLOGY | Facility: OTHER | Age: 71
End: 2021-05-25

## 2021-05-25 DIAGNOSIS — Z72.0 TOBACCO ABUSE: ICD-10-CM

## 2021-05-25 DIAGNOSIS — F17.211 CIGARETTE NICOTINE DEPENDENCE IN REMISSION: ICD-10-CM

## 2021-05-25 ASSESSMENT — MIFFLIN-ST. JEOR: SCORE: 1234.46

## 2021-05-30 ENCOUNTER — RECORDS - HEALTHEAST (OUTPATIENT)
Dept: ADMINISTRATIVE | Facility: CLINIC | Age: 71
End: 2021-05-30

## 2021-05-31 VITALS — BODY MASS INDEX: 23.54 KG/M2 | HEIGHT: 67 IN | WEIGHT: 150 LBS

## 2021-06-01 ENCOUNTER — COMMUNICATION - HEALTHEAST (OUTPATIENT)
Dept: MULTI SPECIALTY CLINIC | Facility: CLINIC | Age: 71
End: 2021-06-01

## 2021-06-01 VITALS — WEIGHT: 139 LBS | BODY MASS INDEX: 21.82 KG/M2 | HEIGHT: 67 IN

## 2021-06-01 NOTE — TELEPHONE ENCOUNTER
Central PA team  [phone]  Pool: HE PA MED ([id number])          PA has been initiated.       PA form completed and faxed insurance via Cover My Meds     Key:  [id number] - PA Case ID: [id number]     Medication:  Chantix Starting Month Danyel 0.5 MG X 11 &1 MG X 42 tablets    Insurance:  [employer] Part D        Response will be received via fax and may take up to 5-10 business days depending on plan

## 2021-06-01 NOTE — TELEPHONE ENCOUNTER
Medication Question or Clarification  Who is calling: Patient  What medication are you calling about? (include dose and sig)   Disp Refills Start End SUNIL    traZODone (DESYREL) 50 MG tablet 30 tablet 0 9/9/2019  --   Sig: Take 1/2 a tablet to 1 tablet each night as needed.   Sent to pharmacy as: traZODone (DESYREL) 50 MG tablet   E-Prescribing Status: Receipt confirmed by pharmacy (9/9/2019 12:25 PM CDT)     Who prescribed the medication?: Miley Tolliver MD   What is your question/concern?: Patient stated she was just seen today and her trazodone was sent to [facility]. Patient stated she does not use [employer] anymore. Please send her Rx to [facility] on [address]. Please cancel her Rx at [facility].  Pharmacy: [facility] on [address]  Okay to leave a detailed message?: Yes  [phone]  Site CMT - Please call the pharmacy to obtain any additional needed information.

## 2021-06-01 NOTE — TELEPHONE ENCOUNTER
Notified patient of her CT scan results.  Discussed the nodules and the locations.  Discussed next steps with referral to pulmonology.  Patient notes that she cut back to half a cigarette already.    Reviewed her cholesterol results.  A letter had been sent.  Informed her that she would benefit from starting statin medication, as her risk of CV event is 16.1% over 10 years.  Patient would like to address the CT scan issue first, which is appropriate.  It is not her preference to start a statin medication.    Referral placed.  Pulmonology will contact patient.

## 2021-06-01 NOTE — TELEPHONE ENCOUNTER
Prior Authorization Request  Who s requesting:  Pharmacy  Pharmacy Name and Location: [facility])  Medication Name: Chantix Starting Month Box  Insurance Plan: [employer] - Medicare Rx  Insurance Member ID Number:  [id number]  Informed patient that prior authorizations can take up to 10 business days for response:   No  Okay to leave a detailed message: No      TRX Code: [email or web address]

## 2021-06-01 NOTE — TELEPHONE ENCOUNTER
Medication Question or Clarification  Who is calling: Patient  What medication are you calling about? (include dose and sig)   varenicline (CHANTIX STARTING MONTH BOX) 0.5 mg (11)- 1 mg (42) tablet 53 tablet 0 2019     Si wk before you stop smoking take 0.5mg daily on days 1-3, 0.5mg 2 times each day on days 4-7, then 1mg 2 times daily.    Sent to pharmacy as: varenicline (CHANTIX STARTING MONTH BOX) 0.5 mg (11)- 1 mg (42) tablet        Who prescribed the medication?:   Miley Tolliver MD  What is your question/concern?:   Patient is requesting new prescription of above medication be sent to [facility] and not [facility].  Pharmacy: [facility] #[id number]  Okay to leave a detailed message?: Yes  Site CMT - Please call the pharmacy to obtain any additional needed information.

## 2021-06-01 NOTE — TELEPHONE ENCOUNTER
Called [facility] pharmacy to cancel patients prescriptions due to change of pharmacy. traZODone (DESYREL) 50 MG tablet, varenicline (CHANTIX STARTING MONTH BOX) 0.5 mg (11)- 1 mg (42) tablet, varenicline (CHANTIX) 1 mg tablet.    KLP, CMA

## 2021-06-01 NOTE — PATIENT INSTRUCTIONS - HE
1) I have reviewed your CT with you.  I am concerned this could represent a metastatic cancer.  We need to get that PET CT done to determine our next course of action  2) I have provided a short course of Xanax to help you anxiety through this difficult stretch.  3) We will be in touch once we have the PET SCAN done

## 2021-06-01 NOTE — PROGRESS NOTES
Assessment and Plan:   Katey was seen today for annual wellness visit.    Diagnoses and all orders for this visit:    Routine general medical examination at a health care facility  -     Tdap vaccine,  6yo or older,  IM  Reviewed healthy lifestyle.  Sees Dr. Chen for her routine gyne exams.  Family history reviewed.  Tdap today.  Will go to [facility] for her flu vaccine.  Discussed Shingrix, she will think about it and check her insurance if interested.      Visit for screening mammogram  -     Mammo Screening Bilateral; Future    Other insomnia   traZODone (DESYREL) 50 MG tablet; Take 1/2 a tablet to 1 tablet each night as needed.  Discussed instructions for use of the medication and potential side effects.    Personal history of nicotine dependence   -     CT Low Dose Lung Screening Chest; Future  -     varenicline (CHANTIX STARTING MONTH BOX) 0.5 mg (11)- 1 mg (42) tablet; 1 wk before you stop smoking take 0.5mg daily on days 1-3, 0.5mg 2 times each day on days 4-7, then 1mg 2 times daily.  -      varenicline (CHANTIX) 1 mg tablet; Take 1 tab by mouth two times a day. Take after eating with a full glass of water. NOTE: Dispense as maintenance for refills only.  Patient is smoking just 2 cigarettes per day currently due to stress. But has a long history of smoking half a pack per day.  Discussed low dose CT scan and patient would like that.  She would like to try Chantix.  She had used it in the past and found it helpful.    Mixed hyperlipidemia  -     Lipid Cascade  We had performed the ASCVD at last visit.  Patient has declined statin in the past.  Will recheck today.    Screening for diabetes mellitus  -     Glucose    Other orders  -     Hepatitis A adult 2 dose IM (19 yr & older)  Discussed risks and benefits of vaccination.      The patient's current medical problems were reviewed.    I have counseled the patient for tobacco cessation and the follow up will occur  at the next visit.  The following health  maintenance schedule was reviewed with the patient and provided in printed form in the after visit summary:   Health Maintenance   Topic Date Due     HEPATITIS C SCREENING  1950     ZOSTER VACCINES (1 of 2) 06/24/1998     INFLUENZA VACCINE RULE BASED (1) 08/01/2019     MAMMOGRAM  08/24/2019     DXA SCAN  08/28/2020     MEDICARE ANNUAL WELLNESS VISIT  09/09/2020     FALL RISK ASSESSMENT  09/09/2020     ADVANCE CARE PLANNING  08/10/2023     COLONOSCOPY  11/24/2025     TD 18+ HE  09/09/2029     PNEUMOCOCCAL POLYSACCHARIDE VACCINE AGE 65 AND OVER  Completed     PNEUMOCOCCAL CONJUGATE VACCINE FOR ADULTS (PCV13 OR PREVNAR)  Completed        Subjective:   Chief Complaint: Katey Singh is an 69 y.o. female here for an Annual Wellness visit.   HPI:     Sees Liseth Chen for OB Gyne.  Was seen for pelvic discomfort this past year.  Had an Ultrasound and a polyp was found.  It was removed.  Last pap was November in 2018.  Was prescribed Activella for diffuse joint aching.  Taking a half a tablet daily.      Stopped aspirin.      Stress: Patient's brother has a difficult demeanor and they have made six moves for care facility.  He is a bigot.  He had a stroke in July.  His behavior had diminished to a 5 year old child.  His toes are necrotic.  He is now in palliative care.  Gets a lot of phone calls from care facility.  He has requested nothing gets cut off.  She is his only family.  Has not been able to take any trips.  Getting massive headaches now.  Taken BP at home.  Woke up at 4 this morning. Difficulties with sleep.  Started taking CBD oil, which has kind of helped.  When getting in bed starts thinking about things and worrying.  Does not nap.    Son is going through gender questioning.  Started taking estrogen online 10 years ago.  Unclear yet what he is deciding.      She did start smoking again due to stress.  Smoking 1-2 cigarettes per day.  Started smoking at 23 years of age - at most a half a pack a day  for 20 years.  Then switched to [employer] and smoking 7 a day, then 4 a day, and then quit 10 years ago.    Review of Systems:  Please see above.  The rest of the review of systems are negative for all systems.    Patient Care Team:  Miley Tolliver MD as PCP - General  Miley Tolliver MD as Assigned PCP     Patient Active Problem List   Diagnosis     Menopause     Joint Pain, Localized In The Knee     Mixed hyperlipidemia     Past Medical History:   Diagnosis Date     Hypertension       Past Surgical History:   Procedure Laterality Date     VA LIGATE FALLOPIAN TUBE      Description: Tubal Ligation;  Proc Date: 01/01/1987;      Family History   Problem Relation Age of Onset     Hypertension Mother      Cancer Father 72        stomach     Hypertension Sister      Cancer Sister 76        Soft tissues sarcoma - 7 lb tumor - lived in [place] - fought for 10 years     Hypertension Maternal Aunt      Hypertension Maternal Aunt      Heart disease Maternal Aunt      Hypertension Maternal Aunt      Diabetes Brother      Hyperlipidemia Brother      Hypertension Brother      Heart disease Brother      Colon cancer Neg Hx      Breast cancer Neg Hx      Clotting disorder Neg Hx       Social History     Socioeconomic History     Marital status:      Spouse name: Not on file     Number of children: Not on file     Years of education: Not on file     Highest education level: Not on file   Occupational History     Not on file   Social Needs     Financial resource strain: Not on file     Food insecurity:     Worry: Not on file     Inability: Not on file     Transportation needs:     Medical: Not on file     Non-medical: Not on file   Tobacco Use     Smoking status: Current Every Day Smoker     Types: Cigarettes     Start date: 7/18/2018     Smokeless tobacco: Never Used     Tobacco comment: Just started back up, 7/18. Smoking about 1 cig/day   Substance and Sexual Activity     Alcohol use: Yes     Comment: rare     Drug use:  "No     Sexual activity: Yes     Partners: Male   Lifestyle     Physical activity:     Days per week: Not on file     Minutes per session: Not on file     Stress: Not on file   Relationships     Social connections:     Talks on phone: Not on file     Gets together: Not on file     Attends Hindu service: Not on file     Active member of club or organization: Not on file     Attends meetings of clubs or organizations: Not on file     Relationship status: Not on file     Intimate partner violence:     Fear of current or ex partner: Not on file     Emotionally abused: Not on file     Physically abused: Not on file     Forced sexual activity: Not on file   Other Topics Concern     Not on file   Social History Narrative     Not on file      Current Outpatient Medications   Medication Sig Dispense Refill     estradiol-norethindrone (ACTIVELLA) 1-0.5 mg per tablet Take 0.5 tablets by mouth daily.       traZODone (DESYREL) 50 MG tablet Take 1/2 a tablet to 1 tablet each night as needed. 30 tablet 0     varenicline (CHANTIX STARTING MONTH BOX) 0.5 mg (11)- 1 mg (42) tablet 1 wk before you stop smoking take 0.5mg daily on days 1-3, 0.5mg 2 times each day on days 4-7, then 1mg 2 times daily. 53 tablet 0     varenicline (CHANTIX) 1 mg tablet Take 1 tab by mouth two times a day. Take after eating with a full glass of water. NOTE: Dispense as maintenance for refills only. 60 tablet 2     No current facility-administered medications for this visit.       Objective:   Vital Signs:   Visit Vitals  /84 (Patient Site: Right Arm, Patient Position: Sitting, Cuff Size: Adult Regular)   Pulse 68   Ht 5' 7.13\" (1.705 m)   Wt 149 lb (67.6 kg)   BMI 23.25 kg/m         VisionScreening:  No exam data present     PHYSICAL EXAM    General Appearance:    Alert, cooperative, no distress, appears stated age   Head:    Normocephalic, without obvious abnormality, atraumatic   Eyes:    PERRL, EOMI   Ears:    Normal TM's and external ear " canals, both ears   Nose:   Nares normal, septum midline, mucosa normal, no drainage    or sinus tenderness   Throat:   Lips, mucosa, and tongue normal; teeth and gums normal   Neck:   Supple, symmetrical, trachea midline, no adenopathy;     thyroid:  no enlargement/tenderness/nodules; no carotid    bruit or JVD   Back:     Symmetric, no curvature, ROM normal, no CVA tenderness   Lungs:     Clear to auscultation bilaterally, respirations unlabored   Chest Wall:    No tenderness or deformity    Heart:    Regular rate and rhythm, S1 and S2 normal, no murmur, rub   or gallop   Breast Exam:    Done at OB Gyne.   Abdomen:     Soft, non-tender, bowel sounds active all four quadrants,     no masses, no organomegaly   Genitalia:    Done at OB Gyne.   Rectal:    Not performed.   Extremities:   Extremities normal, atraumatic, no cyanosis or edema   Pulses:   2+ and symmetric all extremities   Skin:   Skin color, texture, turgor normal, no rashes or lesions   Lymph nodes:   Cervical, supraclavicular, and axillary nodes normal   Neurologic:   CNII-XII intact, normal strength, sensation and reflexes     throughout         Assessment Results 9/9/2019   Activities of Daily Living No help needed   Instrumental Activities of Daily Living No help needed   Mini Cog Total Score 5   Some recent data might be hidden     A Mini-Cog score of 0-2 suggests the possibility of dementia, score of 3-5 suggests no dementia    Identified Health Risks:     The patient was provided with suggestions to help her develop a healthy emotional lifestyle.   Information regarding advance directives (living wall), including where she can download the appropriate form, was provided to the patient via the AVS.

## 2021-06-01 NOTE — PROGRESS NOTES
Assessment and Plan:Katey Singh is a 69 y.o. F with a past medical history significant for tobacco abuse who presents to clinic today for a positive lung cancer screening test.  She has 3 large, round pulmonary nodules in her chest that are concerning for cancerous growth.  They are bilateral and are less likely to represent a lung primary.  A PET scan is the next step in her workup to determine the extent of any other metastasis, or to validate which one of these lesions should be biopsied.  Any of these should be readily accessible via a CT guided FNA.  She has had substantial anxiety over the 3 week delay imposed by her insurance company for this PET scan.  She is not sleeping, and eating poorly because of this.  I believe treating this with anxiolytics for situational anxiety is warranted..   1. Anxiety from adjustment disorder - prescribed Xanax 0.25mg three times a day prn for anxiety and sleep.  Quantity 30.  Our two factor authentication step is not working since the Epic upgrade.  Provided a paper prescription if this will work.  2. Lung nodules - proceed with PET scan as above - will be in touch regarding results when available.  3. RTC prn    CCx: nodules    HPI: Ms. Singh is a 68 yo female who presents to discuss the results of her lung cancer screening study.  She has no lung symptoms whatsoever, nor any personal history of cancer.  She has had negative evaluations for endometrial thickening and yearly mammograms.  She completed her colonoscopy 4 years ago.  She is actively quitting smoking, is down to 1-2 cigarettes per day on chantix.  The PET scan was ordered to workup her nodules but it has been scheduled out 3 weeks per insurance requirements.   She is sleeping very poorly, is not eating well and is constantly dreading the worse case scenario.      PMH:  Past Medical History:   Diagnosis Date     Hypertension        PSH:  Past Surgical History:   Procedure Laterality Date     [place] LIGATE  FALLOPIAN TUBE      Description: Tubal Ligation;  Proc Date: 01/01/1987;       SH:  Social History     Socioeconomic History     Marital status:      Spouse name: Not on file     Number of children: Not on file     Years of education: Not on file     Highest education level: Not on file   Occupational History     Not on file   Social Needs     Financial resource strain: Not on file     Food insecurity:     Worry: Not on file     Inability: Not on file     Transportation needs:     Medical: Not on file     Non-medical: Not on file   Tobacco Use     Smoking status: Current Every Day Smoker     Types: Cigarettes     Start date: 7/18/2018     Smokeless tobacco: Never Used     Tobacco comment: Just started back up, 7/18. Smoking about 1 cig/day   Substance and Sexual Activity     Alcohol use: Yes     Comment: rare     Drug use: No     Sexual activity: Yes     Partners: Male   Lifestyle     Physical activity:     Days per week: Not on file     Minutes per session: Not on file     Stress: Not on file   Relationships     Social connections:     Talks on phone: Not on file     Gets together: Not on file     Attends [employer] service: Not on file     Active member of club or organization: Not on file     Attends meetings of clubs or organizations: Not on file     Relationship status: Not on file     Intimate partner violence:     Fear of current or ex partner: Not on file     Emotionally abused: Not on file     Physically abused: Not on file     Forced sexual activity: Not on file   Other Topics Concern     Not on file   Social History Narrative     Not on file       Family history:  Family History   Problem Relation Age of Onset     Hypertension Mother      Cancer Father 72        stomach     Hypertension Sister      Cancer Sister 76        Soft tissues sarcoma - 7 lb tumor - lived in [place] - fought for 10 years     Hypertension Maternal Aunt      Hypertension Maternal Aunt      Heart disease Maternal Aunt       Hypertension Maternal Aunt      Diabetes Brother      Hyperlipidemia Brother      Hypertension Brother      Heart disease Brother      Colon cancer Neg Hx      Breast cancer Neg Hx      Clotting disorder Neg Hx      The family history was reviewed and is not pertinent to the chief complaint or HPI.    ROS:  Review of Systems - History obtained from the patient  General ROS: negative  Psychological ROS: severe situational anxiety  ENT ROS: negative  Allergy and Immunology ROS: negative  Endocrine ROS: negative  Respiratory ROS:   negative for - cough, hemoptysis, orthopnea, pleuritic pain, shortness of breath, sputum changes, stridor, tachypnea or wheezing  Cardiovascular ROS: no chest pain or palpitations  Gastrointestinal ROS: no abdominal pain, change in bowel habits, or black or bloody stools  Genito-Urinary ROS: no dysuria, trouble voiding, or hematuria  Musculoskeletal ROS: negative  Neurological ROS: no TIA or stroke symptoms  Dermatological ROS: negative      Current Meds:  Current Outpatient Medications   Medication Sig     estradiol-norethindrone (ACTIVELLA) 1-0.5 mg per tablet Take 0.5 tablets by mouth daily.     traZODone (DESYREL) 50 MG tablet Take 1/2 a tablet to 1 tablet each night as needed.     varenicline (CHANTIX STARTING MONTH BOX) 0.5 mg (11)- 1 mg (42) tablet 1 wk before you stop smoking take 0.5mg daily on days 1-3, 0.5mg 2 times each day on days 4-7, then 1mg 2 times daily.     varenicline (CHANTIX) 1 mg tablet Take 1 tab by mouth two times a day. Take after eating with a full glass of water. NOTE: Dispense as maintenance for refills only.     ALPRAZolam (XANAX) 0.25 MG tablet Take 1 tablet (0.25 mg total) by mouth 3 (three) times a day as needed for sleep or anxiety.       Labs:  No results found for this or any previous visit (from the past 72 hour(s)).    I have personally reviewed all imaging and PFT data available pertinent to this visit.    Imaging studies:  Ct Low Dose Lung Screening  "Chest    Result Date: 9/17/2019  EXAM: LOW DOSE LUNG CANCER SCREENING CT CHEST LOCATION: [facility] DATE/TIME: 9/17/2019 11:57 AM INDICATION: Lung cancer screening. High risk patient with greater than 30 pack year smoking history. COMPARISON: None. TECHNIQUE: Low-dose lung cancer screening noncontrast CT chest. Dose reduction techniques were used. FINDINGS: LUNGS AND PLEURA: 1.8 x 1.4 cm well-circumscribed pulmonary nodule in the right lower lobe posteriorly. 8 mm noncalcified nodule in the right middle lobe. 16 x 12 mm noncalcified pulmonary nodule left lower lobe anteriorly, abutting the major fissure. Mild centrilobular emphysema. MEDIASTINUM: Small mediastinal nodes are not enlarged by size criteria. CORONARY ARTERY CALCIFICATION: No visible coronary artery calcification. LIMITED UPPER ABDOMEN: 8 mm benign left renal angiomyolipoma. MUSCULOSKELETAL: Negative.     CONCLUSION: 1.  There are solid, well-circumscribed pulmonary nodules in the left lower lobe, right lower lobe, and right middle lobe, measuring up to 1.8 cm in diameter. RADIOLOGIST RECOMMENDATION: PET/CT. LungRADS CATEGORY: 4BS: Suspicious. SIGNIFICANT INCIDENTAL FINDINGS: Positive. Lung nodules may represent metastatic disease from a non lung primary.          Physical Exam:  /82   Pulse 77   Resp 12   Ht 5' 7\" (1.702 m)   Wt 149 lb (67.6 kg)   SpO2 97%   Breastfeeding? No   BMI 23.34 kg/m    General - Well nourished  Ears/Mouth - OP pink moist, no thrush  Neck - no cervical lymphadenopathy  Lungs - Clear to ausculation bilaterally   CVS - regular rhythm with no murmurs, rubs or gallups  Abdomen - soft, NT, ND, NABS  Ext - no cyanosis, clubbing or edema  Skin - no rash  Psychology - alert and oriented, answers appropriate        Electronically signed by:    aDyday Meyer MD PhD  [facility] Pulmonary and Critical Care Medicine  "

## 2021-06-02 NOTE — PRE-PROCEDURE
Procedure Name: right lung bx  Date/Time: 10/28/2019 10:20 AM    Verbal consent obtained?: Yes  Written consent obtained?: Yes  Risks and benefits: Risks, benefits and alternatives were discussed  Consent given by: patient  Expected level of sedation: moderate  ASA Class: Class 2- mild systemic disease, no acute problems, no functional limitations  Mallampati: Grade 2- soft palate, base of uvula, tonsillar pillars, and portion of posterior pharyngeal wall visible  Patient states understanding of procedure being performed: Yes  Patient's understanding of procedure matches consent: Yes  Procedure consent matches procedure scheduled: Yes  Appropriately NPO: yes  Lungs: lungs clear with good breath sounds bilaterally  Heart: normal heart sounds and rate  History & Physical reviewed: History and physical reviewed and no updates needed  Statement of review: I have reviewed the lab findings, diagnostic data, medications, and the plan for sedation       This RN received call from tele at [phone] stating patient HR in 190's. RN assessed patient, HR in 200's. RRT called at [phone]. Physician did not arrive for over 20 minutes. Language line used during RRT to keep patient informed of happenings.  10 mg Cardizem gi

## 2021-06-02 NOTE — TELEPHONE ENCOUNTER
New Appointment Needed  What is the reason for the visit:    Pre-Op Appt Request  When is the surgery? :  10/28/19  Where is the surgery?:   [facility]  Who is the surgeon? :  Patient was unsure  What type of surgery is being done?: Lung Biopsy  Provider Preference: Any available  How soon do you need to be seen?: before 10/28/19  Waitlist offered?: No  Okay to leave a detailed message:  Yes

## 2021-06-02 NOTE — TELEPHONE ENCOUNTER
Patient checking status. Wondering if provider is able to fir her in or is it ok to schedule with someone else. Please call.

## 2021-06-02 NOTE — PROGRESS NOTES
Preoperative Exam    Scheduled Procedure: Lung Biopsy  Surgery Date:  10/28/19  Surgery Location: [facility], fax [phone]    Surgeon:  Dayday Meyer    Assessment/Plan:     Katey was seen today for pre-op exam.    Diagnoses and all orders for this visit:    Preoperative examination  -     JIC RED  -     JIC LAV  -     Electrocardiogram Perform and Read  -     HM1(CBC and Differential)  -     HM1 (CBC with Diff)  Patient is approved for procedure with local or general anesthesia.    Pulmonary nodules    Hyperlipidemia LDL goal <130  -     atorvastatin (LIPITOR) 20 MG tablet; Take 1 tablet (20 mg total) by mouth at bedtime.  -     Comprehensive Metabolic Panel  -     Lipid Cascade; Future  -     Hepatic Profile; Future  Per our last conversation, patient is now open to starting on statin medication.  Reviewed the risks and benefits of medication.  Recommend recheck in 6-8 weeks.    Nonspecific abnormal electrocardiogram (ECG) (EKG)  -     Echo Stress Exercise; Future  -     [facility]  Patient with new T wave inversions and ST abnormalities compared to last EKG in 2016.  We reviewed these findings in detail and I had referred patient for an exercise stress test.  Exercise stress test performed 10/25/2019 was negative for inducible for ischemic EKG changes.  Patient is okay to proceed with procedure.        Surgical Procedure Risk: Low (reported cardiac risk generally < 1%)  Have you had prior anesthesia?: No  Have you or any family members had a previous anesthesia reaction:  No  Do you or any family members have a history of a clotting or bleeding disorder?: No  Cardiac Risk Assessment: no increased risk for major cardiac complications    APPROVAL GIVEN to proceed with proposed procedure, without further diagnostic evaluation      Functional Status: Independent  Patient plans to recover at home with family.     Subjective:      Katey DA SILVA Christozariand is a 69 y.o.  female who presents for a preoperative consultation.      HPI:  Patient had a routine preventative low dose CT scan of lung based on smoking history.  CT scan was abnormal with several well circumscribed pulmonary nodules.  PET scan did not reveal any other suspicious lesions.  Patient is here for preOp for CT guided lung biopsy.  Patient has a cold right now x 2 days.  No fevers or chills.  Cough is dry.    All other systems reviewed and are negative, other than those listed in the HPI.    Pertinent History  Do you have difficulty breathing or chest pain after walking up a flight of stairs: No  History of obstructive sleep apnea: No  Steroid use in the last 6 months: No  Frequent Aspirin/NSAID use: No  Prior Blood Transfusion: No  Prior Blood Transfusion Reaction: No  If for some reason prior to, during or after the procedure, if it is medically indicated, would you be willing to have a blood transfusion?:  There is no transfusion refusal.    Current Outpatient Medications   Medication Sig Dispense Refill     estradiol-norethindrone (ACTIVELLA) 1-0.5 mg per tablet Take 0.5 tablets by mouth daily.       atorvastatin (LIPITOR) 20 MG tablet Take 1 tablet (20 mg total) by mouth at bedtime. 90 tablet 0     No current facility-administered medications for this visit.         Allergies   Allergen Reactions     Penicillins Anaphylaxis       Patient Active Problem List   Diagnosis     Menopause     Joint Pain, Localized In The Knee     Mixed hyperlipidemia       Past Medical History:   Diagnosis Date     Hypertension        Past Surgical History:   Procedure Laterality Date     [place] LIGATE FALLOPIAN TUBE      Description: Tubal Ligation;  Proc Date: 01/01/1987;       Social History     Socioeconomic History     Marital status:      Spouse name: Not on file     Number of children: Not on file     Years of education: Not on file     Highest education level: Not on file   Occupational History     Not on file   Social  "Needs     Financial resource strain: Not on file     Food insecurity:     Worry: Not on file     Inability: Not on file     Transportation needs:     Medical: Not on file     Non-medical: Not on file   Tobacco Use     Smoking status: Current Every Day Smoker     Types: Cigarettes     Start date: 7/18/2018     Smokeless tobacco: Never Used     Tobacco comment: Just started back up, 7/18. Smoking about 1 cig/day   Substance and Sexual Activity     Alcohol use: Yes     Comment: rare     Drug use: No     Sexual activity: Yes     Partners: Male   Lifestyle     Physical activity:     Days per week: Not on file     Minutes per session: Not on file     Stress: Not on file   Relationships     Social connections:     Talks on phone: Not on file     Gets together: Not on file     Attends [employer] service: Not on file     Active member of club or organization: Not on file     Attends meetings of clubs or organizations: Not on file     Relationship status: Not on file     Intimate partner violence:     Fear of current or ex partner: Not on file     Emotionally abused: Not on file     Physically abused: Not on file     Forced sexual activity: Not on file   Other Topics Concern     Not on file   Social History Narrative     Not on file       Patient Care Team:  Miley Tolliver MD as PCP - General  Miley Tolliver MD as Assigned PCP          Objective:     Vitals:    10/24/19 1607 10/24/19 1617   BP: 150/88 146/82   Pulse: 64    Resp: 16    Temp: 98.6  F (37  C)    SpO2: 98%    Weight: 148 lb 12 oz (67.5 kg)    Height: 5' 7.5\" (1.715 m)          Physical Exam:    General Appearance:    Alert, cooperative, no distress, appears stated age   Head:    Normocephalic, without obvious abnormality, atraumatic   Eyes:    PERRL, EOMI   Ears:    Normal TM's and external ear canals, both ears   Nose:   Nares normal, septum midline, mucosa normal, no drainage    or sinus tenderness   Throat:   Lips, mucosa, and tongue normal; teeth and gums normal "   Neck:   Supple, symmetrical, trachea midline, no adenopathy;     thyroid:  no enlargement/tenderness/nodules; no carotid    bruit or JVD   Back:     Symmetric, no curvature, ROM normal, no CVA tenderness   Lungs:     Clear to auscultation bilaterally, respirations unlabored   Chest Wall:    No tenderness or deformity    Heart:    Regular rate and rhythm, S1 and S2 normal, no murmur, rub   or gallop   Breast Exam:    Not performed.   Abdomen:     Soft, non-tender, bowel sounds active all four quadrants,     no masses, no organomegaly   Genitalia:    Not performed.   Rectal:    Not performed.   Extremities:   Extremities normal, atraumatic, no cyanosis or edema   Pulses:   2+ and symmetric all extremities   Skin:   Skin color, texture, turgor normal, no rashes or lesions   Lymph nodes:   Cervical, supraclavicular, and axillary nodes normal   Neurologic:   CNII-XII intact, normal strength, sensation and reflexes     throughout     LABS:    There are no Patient Instructions on file for this visit.    Recent Results (from the past 240 hour(s))   Electrocardiogram Perform and Read   Result Value Ref Range    SYSTOLIC BLOOD PRESSURE      DIASTOLIC BLOOD PRESSURE      VENTRICULAR RATE 59 BPM    ATRIAL RATE 59 BPM    P-R INTERVAL 178 ms    QRS DURATION 80 ms    Q-T INTERVAL 428 ms    QTC CALCULATION (BEZET) 423 ms    P Axis 75 degrees    R AXIS 26 degrees    T AXIS 211 degrees    MUSE DIAGNOSIS       Sinus bradycardia  ST & T wave abnormality, consider lateral ischemia  Abnormal ECG  When compared with ECG of 13-APR-2016 09:27,  Inverted T waves have replaced nonspecific T wave abnormality in Lateral leads     Comprehensive Metabolic Panel   Result Value Ref Range    Sodium 142 136 - 145 mmol/L    Potassium 4.5 3.5 - 5.0 mmol/L    Chloride 107 98 - 107 mmol/L    CO2 28 22 - 31 mmol/L    Anion Gap, Calculation 7 5 - 18 mmol/L    Glucose 104 70 - 125 mg/dL    BUN 12 8 - 28 mg/dL    Creatinine 0.79 0.60 - 1.10 mg/dL    GFR  MDRD Af Amer >60 >60 mL/min/1.73m2    GFR MDRD Non Af Amer >60 >60 mL/min/1.73m2    Bilirubin, Total 0.5 0.0 - 1.0 mg/dL    Calcium 10.1 8.5 - 10.5 mg/dL    Protein, Total 6.6 6.0 - 8.0 g/dL    Albumin 3.9 3.5 - 5.0 g/dL    Alkaline Phosphatase 71 45 - 120 U/L    AST 13 0 - 40 U/L    ALT <9 0 - 45 U/L   HM1 (CBC with Diff)   Result Value Ref Range    WBC 10.1 4.0 - 11.0 thou/uL    RBC 4.71 3.80 - 5.40 mill/uL    Hemoglobin 14.8 12.0 - 16.0 g/dL    Hematocrit 44.8 35.0 - 47.0 %    MCV 95 80 - 100 fL    MCH 31.4 27.0 - 34.0 pg    MCHC 33.0 32.0 - 36.0 g/dL    RDW 13.4 11.0 - 14.5 %    Platelets 211 140 - 440 thou/uL    MPV 11.6 8.5 - 12.5 fL    Neutrophils % 61 50 - 70 %    Lymphocytes % 28 20 - 40 %    Monocytes % 7 2 - 10 %    Eosinophils % 3 0 - 6 %    Basophils % 0 0 - 2 %    Neutrophils Absolute 6.2 2.0 - 7.7 thou/uL    Lymphocytes Absolute 2.9 0.8 - 4.4 thou/uL    Monocytes Absolute 0.7 0.0 - 0.9 thou/uL    Eosinophils Absolute 0.3 0.0 - 0.4 thou/uL    Basophils Absolute 0.0 0.0 - 0.2 thou/uL     Immunization History   Administered Date(s) Administered     Hep A, Adult IM (19yr & older) 09/09/2019     Influenza [id number], 12/08/2009     Influenza high dose,seasonal,PF, 65+ yrs 12/12/2016, 11/21/2017     Influenza, inj, historic,unspecified 10/25/1999, 10/19/2010, 01/10/2013, 12/16/2013, 10/23/2015, 10/17/2019     Influenza,seasonal quad, PF, =/> 6months 10/10/2018     Influenza,seasonal, Inj IIV3 10/13/2011     Pneumo Conj 13-V (2010&after) 07/20/2015     Pneumo Polysac 23-V 08/10/2018     Tdap 02/16/2010, 09/09/2019           Electronically signed by Miley Tolliver MD 10/25/19 4:12 PM

## 2021-06-02 NOTE — PROGRESS NOTES
Discussed PET results with radiology MD.  Low uptake.  Most consistent with carcinoid.  Follow up CT vs. Somatostatin nuc med vs. RLL nodule biopsy.      I will pass on to Dr. Blandon for discussion with patient.

## 2021-06-02 NOTE — TELEPHONE ENCOUNTER
Spoke with patient today regarding her stress echocardiogram.  At this time the result does show that the stress test was negative for inducible ischemic EKG changes.  Therefore I have updated her preop okaying her to proceed with her procedure on Monday.    I did review her medication and it appears that there is an outside medication of Activella where she is taking half a tablet daily for joint aches and menopause that is prescribed by her gynecologist Dr. Chen.  I did discuss with her smoking history as well as her new diagnosis of hyperlipidemia as well as her borderline blood pressures, medication with estrogen would not be recommended because of increased risk of blood clots, cardiovascular events.  Patient notes that she did quit smoking since her abnormal CT scan.  I did discuss its effects on her lipids.  Patient notes that she declined cessation of this medication entirely.  She notes that she has read a lot of medical literature and will absolutely refuse to stop taking this medication.

## 2021-06-02 NOTE — TELEPHONE ENCOUNTER
Spoke with patient and scheduled her for Thursday Oct 24th at 4:00pm. Patient has no further questions.     TARAP, CMA

## 2021-06-02 NOTE — TELEPHONE ENCOUNTER
Who is calling:  Patient.  Reason for Call:  States just had a full body PET scan and does not think she should have to do the mammogram.   Please advise if she needs to still do a mammogram or not.  Date of last appointment with primary care: 9/9/2019  Okay to leave a detailed message: Yes

## 2021-06-02 NOTE — PROCEDURES
[facility]    Procedure: Imaging Procedure Note  Date/Time: 10/28/2019 11:28 AM  Performed by: Garcia Arredondo MD  Authorized by: Garcia Arredondo MD       Universal Protocol    Site marked: Yes    Prior images obtained and reviewed: Yes    Required items: required blood products, implants, devices, and special equipment available    Patient identity confirmed: verbally with patient and arm band    Reevaluation: Patient was reevaluated immediately before administering moderate or deep sedation or anesthesia    Confirmation checklist: patient's identity using two indicators, relevant allergies, procedure was appropriate and matched the consent or emergent situation and correct equipment/implants were available    Time out: Immediately prior to procedure a time out was called to verify the correct patient, procedure, equipment, support staff and site/side marked as required    Universal Protocol: Joint Commission Universal Protocol was followed    Preparation: Patient was prepped and draped in the usual sterile fashion    ESBL (mL): 5    Anesthesia    Local anesthesia used?: Yes    Local anesthetic: lidocaine 1% without epinephrine    Anesthetic total (mL): 15    Sedation    Patient sedation: Yes    Sedation: fentanyl and midazolam    Vital signs: Vital signs monitored during sedation    Post-procedure    Description of procedure: Rt lung bx    Patient tolerance: Patient tolerated the procedure well with no immediate complications   Length of time physician present for 1:1 monitoring during sedation: 45

## 2021-06-02 NOTE — H&P (VIEW-ONLY)
Preoperative Exam    Scheduled Procedure: Lung Biopsy  Surgery Date:  10/28/19  Surgery Location: [facility], fax [phone]    Surgeon:  Dayday Meyer    Assessment/Plan:     Katey was seen today for pre-op exam.    Diagnoses and all orders for this visit:    Preoperative examination  -     JIC RED  -     JIC LAV  -     Electrocardiogram Perform and Read  -     HM1(CBC and Differential)  -     HM1 (CBC with Diff)  Patient is approved for procedure with local or general anesthesia.    Pulmonary nodules    Hyperlipidemia LDL goal <130  -     atorvastatin (LIPITOR) 20 MG tablet; Take 1 tablet (20 mg total) by mouth at bedtime.  -     Comprehensive Metabolic Panel  -     Lipid Cascade; Future  -     Hepatic Profile; Future  Per our last conversation, patient is now open to starting on statin medication.  Reviewed the risks and benefits of medication.  Recommend recheck in 6-8 weeks.    Nonspecific abnormal electrocardiogram (ECG) (EKG)  -     Echo Stress Exercise; Future  -     [facility]  Patient with new T wave inversions and ST abnormalities compared to last EKG in 2016.  We reviewed these findings in detail and I had referred patient for an exercise stress test.  Exercise stress test performed 10/25/2019 was negative for inducible for ischemic EKG changes.  Patient is okay to proceed with procedure.        Surgical Procedure Risk: Low (reported cardiac risk generally < 1%)  Have you had prior anesthesia?: No  Have you or any family members had a previous anesthesia reaction:  No  Do you or any family members have a history of a clotting or bleeding disorder?: No  Cardiac Risk Assessment: no increased risk for major cardiac complications    APPROVAL GIVEN to proceed with proposed procedure, without further diagnostic evaluation      Functional Status: Independent  Patient plans to recover at home with family.     Subjective:      Katey DA SILVA Christozariand is a 69 y.o.  female who presents for a preoperative consultation.      HPI:  Patient had a routine preventative low dose CT scan of lung based on smoking history.  CT scan was abnormal with several well circumscribed pulmonary nodules.  PET scan did not reveal any other suspicious lesions.  Patient is here for preOp for CT guided lung biopsy.  Patient has a cold right now x 2 days.  No fevers or chills.  Cough is dry.    All other systems reviewed and are negative, other than those listed in the HPI.    Pertinent History  Do you have difficulty breathing or chest pain after walking up a flight of stairs: No  History of obstructive sleep apnea: No  Steroid use in the last 6 months: No  Frequent Aspirin/NSAID use: No  Prior Blood Transfusion: No  Prior Blood Transfusion Reaction: No  If for some reason prior to, during or after the procedure, if it is medically indicated, would you be willing to have a blood transfusion?:  There is no transfusion refusal.    Current Outpatient Medications   Medication Sig Dispense Refill     estradiol-norethindrone (ACTIVELLA) 1-0.5 mg per tablet Take 0.5 tablets by mouth daily.       atorvastatin (LIPITOR) 20 MG tablet Take 1 tablet (20 mg total) by mouth at bedtime. 90 tablet 0     No current facility-administered medications for this visit.         Allergies   Allergen Reactions     Penicillins Anaphylaxis       Patient Active Problem List   Diagnosis     Menopause     Joint Pain, Localized In The Knee     Mixed hyperlipidemia       Past Medical History:   Diagnosis Date     Hypertension        Past Surgical History:   Procedure Laterality Date     NH LIGATE FALLOPIAN TUBE      Description: Tubal Ligation;  Proc Date: 01/01/1987;       Social History     Socioeconomic History     Marital status:      Spouse name: Not on file     Number of children: Not on file     Years of education: Not on file     Highest education level: Not on file   Occupational History     Not on file   Social  "Needs     Financial resource strain: Not on file     Food insecurity:     Worry: Not on file     Inability: Not on file     Transportation needs:     Medical: Not on file     Non-medical: Not on file   Tobacco Use     Smoking status: Current Every Day Smoker     Types: Cigarettes     Start date: 7/18/2018     Smokeless tobacco: Never Used     Tobacco comment: Just started back up, 7/18. Smoking about 1 cig/day   Substance and Sexual Activity     Alcohol use: Yes     Comment: rare     Drug use: No     Sexual activity: Yes     Partners: Male   Lifestyle     Physical activity:     Days per week: Not on file     Minutes per session: Not on file     Stress: Not on file   Relationships     Social connections:     Talks on phone: Not on file     Gets together: Not on file     Attends Judaism service: Not on file     Active member of club or organization: Not on file     Attends meetings of clubs or organizations: Not on file     Relationship status: Not on file     Intimate partner violence:     Fear of current or ex partner: Not on file     Emotionally abused: Not on file     Physically abused: Not on file     Forced sexual activity: Not on file   Other Topics Concern     Not on file   Social History Narrative     Not on file       Patient Care Team:  Miley Tolliver MD as PCP - General  Miley Tolliver MD as Assigned PCP          Objective:     Vitals:    10/24/19 1607 10/24/19 1617   BP: 150/88 146/82   Pulse: 64    Resp: 16    Temp: 98.6  F (37  C)    SpO2: 98%    Weight: 148 lb 12 oz (67.5 kg)    Height: 5' 7.5\" (1.715 m)          Physical Exam:    General Appearance:    Alert, cooperative, no distress, appears stated age   Head:    Normocephalic, without obvious abnormality, atraumatic   Eyes:    PERRL, EOMI   Ears:    Normal TM's and external ear canals, both ears   Nose:   Nares normal, septum midline, mucosa normal, no drainage    or sinus tenderness   Throat:   Lips, mucosa, and tongue normal; teeth and gums normal "   Neck:   Supple, symmetrical, trachea midline, no adenopathy;     thyroid:  no enlargement/tenderness/nodules; no carotid    bruit or JVD   Back:     Symmetric, no curvature, ROM normal, no CVA tenderness   Lungs:     Clear to auscultation bilaterally, respirations unlabored   Chest Wall:    No tenderness or deformity    Heart:    Regular rate and rhythm, S1 and S2 normal, no murmur, rub   or gallop   Breast Exam:    Not performed.   Abdomen:     Soft, non-tender, bowel sounds active all four quadrants,     no masses, no organomegaly   Genitalia:    Not performed.   Rectal:    Not performed.   Extremities:   Extremities normal, atraumatic, no cyanosis or edema   Pulses:   2+ and symmetric all extremities   Skin:   Skin color, texture, turgor normal, no rashes or lesions   Lymph nodes:   Cervical, supraclavicular, and axillary nodes normal   Neurologic:   CNII-XII intact, normal strength, sensation and reflexes     throughout     LABS:    There are no Patient Instructions on file for this visit.    Recent Results (from the past 240 hour(s))   Electrocardiogram Perform and Read   Result Value Ref Range    SYSTOLIC BLOOD PRESSURE      DIASTOLIC BLOOD PRESSURE      VENTRICULAR RATE 59 BPM    ATRIAL RATE 59 BPM    P-R INTERVAL 178 ms    QRS DURATION 80 ms    Q-T INTERVAL 428 ms    QTC CALCULATION (BEZET) 423 ms    P Axis 75 degrees    R AXIS 26 degrees    T AXIS 211 degrees    MUSE DIAGNOSIS       Sinus bradycardia  ST & T wave abnormality, consider lateral ischemia  Abnormal ECG  When compared with ECG of 13-APR-2016 09:27,  Inverted T waves have replaced nonspecific T wave abnormality in Lateral leads     Comprehensive Metabolic Panel   Result Value Ref Range    Sodium 142 136 - 145 mmol/L    Potassium 4.5 3.5 - 5.0 mmol/L    Chloride 107 98 - 107 mmol/L    CO2 28 22 - 31 mmol/L    Anion Gap, Calculation 7 5 - 18 mmol/L    Glucose 104 70 - 125 mg/dL    BUN 12 8 - 28 mg/dL    Creatinine 0.79 0.60 - 1.10 mg/dL    GFR  MDRD Af Amer >60 >60 mL/min/1.73m2    GFR MDRD Non Af Amer >60 >60 mL/min/1.73m2    Bilirubin, Total 0.5 0.0 - 1.0 mg/dL    Calcium 10.1 8.5 - 10.5 mg/dL    Protein, Total 6.6 6.0 - 8.0 g/dL    Albumin 3.9 3.5 - 5.0 g/dL    Alkaline Phosphatase 71 45 - 120 U/L    AST 13 0 - 40 U/L    ALT <9 0 - 45 U/L   HM1 (CBC with Diff)   Result Value Ref Range    WBC 10.1 4.0 - 11.0 thou/uL    RBC 4.71 3.80 - 5.40 mill/uL    Hemoglobin 14.8 12.0 - 16.0 g/dL    Hematocrit 44.8 35.0 - 47.0 %    MCV 95 80 - 100 fL    MCH 31.4 27.0 - 34.0 pg    MCHC 33.0 32.0 - 36.0 g/dL    RDW 13.4 11.0 - 14.5 %    Platelets 211 140 - 440 thou/uL    MPV 11.6 8.5 - 12.5 fL    Neutrophils % 61 50 - 70 %    Lymphocytes % 28 20 - 40 %    Monocytes % 7 2 - 10 %    Eosinophils % 3 0 - 6 %    Basophils % 0 0 - 2 %    Neutrophils Absolute 6.2 2.0 - 7.7 thou/uL    Lymphocytes Absolute 2.9 0.8 - 4.4 thou/uL    Monocytes Absolute 0.7 0.0 - 0.9 thou/uL    Eosinophils Absolute 0.3 0.0 - 0.4 thou/uL    Basophils Absolute 0.0 0.0 - 0.2 thou/uL     Immunization History   Administered Date(s) Administered     Hep A, Adult IM (19yr & older) 09/09/2019     Influenza [id number], 12/08/2009     Influenza high dose,seasonal,PF, 65+ yrs 12/12/2016, 11/21/2017     Influenza, inj, historic,unspecified 10/25/1999, 10/19/2010, 01/10/2013, 12/16/2013, 10/23/2015, 10/17/2019     Influenza,seasonal quad, PF, =/> 6months 10/10/2018     Influenza,seasonal, Inj IIV3 10/13/2011     Pneumo Conj 13-V (2010&after) 07/20/2015     Pneumo Polysac 23-V 08/10/2018     Tdap 02/16/2010, 09/09/2019           Electronically signed by Miley Tolliver MD 10/25/19 4:12 PM

## 2021-06-03 VITALS
HEIGHT: 68 IN | DIASTOLIC BLOOD PRESSURE: 102 MMHG | RESPIRATION RATE: 24 BRPM | WEIGHT: 149.6 LBS | OXYGEN SATURATION: 98 % | BODY MASS INDEX: 22.67 KG/M2 | HEART RATE: 80 BPM | SYSTOLIC BLOOD PRESSURE: 162 MMHG

## 2021-06-03 VITALS
BODY MASS INDEX: 23.39 KG/M2 | WEIGHT: 149 LBS | SYSTOLIC BLOOD PRESSURE: 122 MMHG | HEART RATE: 68 BPM | HEIGHT: 67 IN | DIASTOLIC BLOOD PRESSURE: 84 MMHG

## 2021-06-03 VITALS
SYSTOLIC BLOOD PRESSURE: 138 MMHG | OXYGEN SATURATION: 97 % | BODY MASS INDEX: 23.39 KG/M2 | HEART RATE: 77 BPM | WEIGHT: 149 LBS | RESPIRATION RATE: 12 BRPM | HEIGHT: 67 IN | DIASTOLIC BLOOD PRESSURE: 82 MMHG

## 2021-06-03 VITALS
HEART RATE: 64 BPM | HEIGHT: 68 IN | DIASTOLIC BLOOD PRESSURE: 82 MMHG | RESPIRATION RATE: 16 BRPM | TEMPERATURE: 98.6 F | WEIGHT: 148.75 LBS | OXYGEN SATURATION: 98 % | BODY MASS INDEX: 22.54 KG/M2 | SYSTOLIC BLOOD PRESSURE: 146 MMHG

## 2021-06-03 VITALS — BODY MASS INDEX: 22.31 KG/M2 | WEIGHT: 147.2 LBS | HEIGHT: 68 IN

## 2021-06-03 NOTE — PROGRESS NOTES
Assessment and Plan:Katey Singh is a 69 y.o. F with a past medical history significant for adenocarcinoma of the Right lower lung and 2 other nodules who presents to clinic today for follow up.  She returns for follow up of these nodules and to discuss the plan with Dr. Dumas.  He is planning to remove the cancer and potentially the other nodule on the same side.  She is concerned with the plan to then do a surgery on the left side 6 weeks later without a biopsy first.  We discussed the option of a CT guided biopsy if she is concerned about a second surgery, as well as how the results of the first surgery, recovery and pathology, could help us decide.    1) Lung cancer - planning for wedge versus lobectomy with Dr. Dumas on Dec 12.  2) Lung nodules - Second nodule on right side not biopsied, this may be removed by wedge at the same surgery.  Left sided nodule consider surgical removal versus biopsy after recovery from first.    3) RTC PRN.           CCx: lung cancer     HPI: Ms. Singh is a 69 year old female who returns to discuss the plan for her lung cancer.  We found three nodules on a screening test, a PET scan was relatively indeterminate, but a biopsy of the largest, easiest to biopsy nodule showed an adenocarcinoma.  She has seen Dr. Dumas who is planning surgery on Aug 12 as we do not have evidence of metastatic disease.  She would like to know if this plan makes sense, and wants to know if she has the option to biopsy before surgery on the left side as she is worried about too many surgeries.  She feels fine and has no new pulmonary symptoms.  She has struggled some with anxiety.  She is also having higher blood pressure lately.    ROS:  Review of Systems - History obtained from the patient  General ROS: negative  Psychological ROS: anxiety  ENT ROS: negative  Allergy and Immunology ROS: negative  Endocrine ROS: negative  Respiratory ROS:   negative for - cough, hemoptysis, orthopnea,  pleuritic pain, shortness of breath, sputum changes, stridor, tachypnea or wheezing  Cardiovascular ROS: no chest pain or palpitations  Gastrointestinal ROS: no abdominal pain, change in bowel habits, or black or bloody stools  Genito-Urinary ROS: no dysuria, trouble voiding, or hematuria  Musculoskeletal ROS: negative  Neurological ROS: no TIA or stroke symptoms  Dermatological ROS: negative      Current Meds:  Current Outpatient Medications   Medication Sig     atorvastatin (LIPITOR) 20 MG tablet Take 1 tablet (20 mg total) by mouth at bedtime. (Patient taking differently: Take 10 mg by mouth at bedtime.       )     estradiol-norethindrone (ACTIVELLA) 1-0.5 mg per tablet Take 0.5 tablets by mouth daily.       Labs:  No results found for this or any previous visit (from the past 72 hour(s)).    I have personally reviewed all pertinent imaging studies and PFT results unless otherwise noted.    Imaging studies:  No results found.      Physical Exam:  BP (!) 154/102   Pulse 66   Resp 17   Wt 152 lb (68.9 kg)   SpO2 97% Comment: RA  BMI 23.46 kg/m    General - Well nourished  Ears/Mouth -  OP pink moist, no thrush  Neck - no cervical lymphadenopathy  Lungs - Clear to ausculation bilaterally   CVS - regular rhythm with no murmurs, rubs or gallups  Abdomen - soft, NT, ND, NABS  Ext - no cyanosis, clubbing or edema  Skin - no rash  Psychology - alert and oriented, answers appropriate        Electronically signed by:    Dayday Meyer MD PhD  [facility]

## 2021-06-03 NOTE — TELEPHONE ENCOUNTER
Patient has scheduled an appointment with Dr. Garcia for pre-op.    Next Appt  With Family Medicine (Leah Garcia MD)  11/26/2019 at 2:20 PM      OVIDIO/DAVID

## 2021-06-03 NOTE — TELEPHONE ENCOUNTER
New Appointment Needed  What is the reason for the visit:    Pre-Op Appt Request  When is the surgery? :  12.12.19  Where is the surgery?:   [facility]   Who is the surgeon? :  Dr. Christian DA SILVA  What type of surgery is being done?: lung surgery   Provider Preference: PCP but willing to see anybody available   How soon do you need to be seen?: sometime before the procedure date   Waitlist offered?: no   Okay to leave a detailed message:  Yes

## 2021-06-03 NOTE — PATIENT INSTRUCTIONS - HE
Before Your Surgery       Call your surgeon if there is any change in your health. This includes signs of a cold or flu (such as a sore throat, runny nose, cough, rash or fever).       Do not smoke, drink alcohol or take over the counter medicine (unless your surgeon or primary care doctor tells you to) for the 24 hours before and after surgery.       If you take prescribed drugs: Follow your doctor s orders about which medicines to take and which to stop until after surgery.      Eating and drinking prior to surgery: follow the instructions from your surgeon.      Take a shower or bath the night before surgery. Use the soap your surgeon gave you to gently clean your skin. If you do not have soap from your surgeon, use your regular soap. Do not shave or scrub the surgery site. Wear clean pajamas and have clean sheets on your bed.             Hold all supplements, aspirin and NSAIDs for 7 days prior to surgery.    Follow your surgeon's direction on when to stop eating and drinking prior to surgery.    Remove all jewelry and metal piercings before your surgery.     Remove nail polish from fingers before surgery.

## 2021-06-03 NOTE — PROGRESS NOTES
Cpft with pre and post spirometry done using albuterol 2.5 mg neb. Ats standards met, patient yovany well, results scanned to patients chart

## 2021-06-03 NOTE — PROGRESS NOTES
Preoperative Exam    Scheduled Procedure: Rt. Lobectomy surgery  Surgery Date:  12/12/19   Surgery Location: [facility], fax [phone]    Surgeon:  Dr. Christian Dumsa     Assessment/Plan:     1. Pre-operative general physical examination  - Basic Metabolic Panel  - HM2(CBC w/o Differential)    2. Malignant neoplasm of lower lobe of right lung (H)  Scheduled for RIGHT VATS lower lobe segmentectomy (S9 and S10) and RIGHT middle lobe wedge resection, with possible thoracotomy    3. Hypertension, unspecified type  Uncontrolled, start lisinopril 5 mg daily, possible increase to 10 mg if still uncontrolled at home.  (Has been on lisinopril in the past)  - lisinopril (PRINIVIL,ZESTRIL) 10 MG tablet; Take 1 tablet (10 mg total) by mouth daily.  Dispense: 30 tablet; Refill: 0     DVT prophylaxis at the discretion of surgeon with sequential compression device or lower molecular weight heparin.    Surgical Procedure Risk: Intermediate (reported cardiac risk generally 1-5%)  Have you had prior anesthesia?: Yes  Have you or any family members had a previous anesthesia reaction:  Yes: brother had knee replacement over 15 years ago; hallucinations  Do you or any family members have a history of a clotting or bleeding disorder?: No  Cardiac Risk Assessment: no increased risk for major cardiac complications    Reviewed risks (not limited to bleeding,infection,pain, pneumothorax, un-anticipated response to anesthesia and even death) and benefits (diagnostic and therapeutic) of surgery with patient, she understands the risks of the procedure and would like to proceed.  Patient's active problems diagnostically and therapeutically optimized for planned procedure with, Local or General anesthesia      Functional Status: Independent  Patient plans to recover at home with family.     Subjective:      Katey Singh is a 69 y.o. female who presents for a preoperative consultation. Patient had a routine preventative low dose CT  scan of lung based on smoking history.  CT scan was abnormal with several well circumscribed pulmonary nodules.  PET scan did not reveal any other suspicious lesions.  Biopsy of 1 the 3 lesion did show adenocarcinoma.  She was seen by pulmonologist and deemed to be a good candidate for surgery.  She had an abnormal EKG with nonspecific T wave inversion, but stress test was normal, denies any chest pain or shortness of breath.  She is in a very good spirit.  Blood pressure mildly elevated today, but denies any symptoms, has been on antihypertensives in the past, she stopped because she was doing fine and blood pressure was normal.    All other systems reviewed and are negative, other than those listed in the HPI.    Pertinent History  Do you have difficulty breathing or chest pain after walking up a flight of stairs: No  History of obstructive sleep apnea: No  Steroid use in the last 6 months: No  Frequent Aspirin/NSAID use: No  Prior Blood Transfusion: No  Prior Blood Transfusion Reaction: No  If for some reason prior to, during or after the procedure, if it is medically indicated, would you be willing to have a blood transfusion?:  There is no transfusion refusal.    Current Outpatient Medications   Medication Sig Dispense Refill     atorvastatin (LIPITOR) 20 MG tablet Take 1 tablet (20 mg total) by mouth at bedtime. 90 tablet 0     estradiol-norethindrone (ACTIVELLA) 1-0.5 mg per tablet Take 0.5 tablets by mouth daily.       lisinopril (PRINIVIL,ZESTRIL) 10 MG tablet Take 1 tablet (10 mg total) by mouth daily. 30 tablet 0     No current facility-administered medications for this visit.         Allergies   Allergen Reactions     Penicillins Anaphylaxis       Patient Active Problem List   Diagnosis     Menopause     Joint Pain, Localized In The Knee     Mixed hyperlipidemia     Malignant neoplasm of lower lobe of right lung (H)     Hypertension       Past Medical History:   Diagnosis Date     Hypertension         Past Surgical History:   Procedure Laterality Date     CT BIOPSY LUNG  10/28/2019     MS LIGATE FALLOPIAN TUBE      Description: Tubal Ligation;  Proc Date: 1987;       Social History     Socioeconomic History     Marital status:      Spouse name: Not on file     Number of children: Not on file     Years of education: Not on file     Highest education level: Not on file   Occupational History     Not on file   Social Needs     Financial resource strain: Not on file     Food insecurity:     Worry: Not on file     Inability: Not on file     Transportation needs:     Medical: Not on file     Non-medical: Not on file   Tobacco Use     Smoking status: Former Smoker     Packs/day: 0.00     Types: Cigarettes     Start date: 2018     Last attempt to quit: 2019     Years since quittin.1     Smokeless tobacco: Never Used     Tobacco comment: Just started back up, . Smoking about 1 cig/day   Substance and Sexual Activity     Alcohol use: Yes     Comment: rare     Drug use: No     Sexual activity: Yes     Partners: Male   Lifestyle     Physical activity:     Days per week: Not on file     Minutes per session: Not on file     Stress: Not on file   Relationships     Social connections:     Talks on phone: Not on file     Gets together: Not on file     Attends Evangelical service: Not on file     Active member of club or organization: Not on file     Attends meetings of clubs or organizations: Not on file     Relationship status: Not on file     Intimate partner violence:     Fear of current or ex partner: Not on file     Emotionally abused: Not on file     Physically abused: Not on file     Forced sexual activity: Not on file   Other Topics Concern     Not on file   Social History Narrative     Not on file       Patient Care Team:  Miley Tolliver MD as PCP - General  Miley Tolliver MD as Assigned PCP      Objective:     Vitals:    19 1451 19 1456   BP: 150/85 150/80   Pulse: 75   "  Temp: 98.1  F (36.7  C)    TempSrc: Oral    SpO2: 98%    Weight: 151 lb 4 oz (68.6 kg)    Height: 5' 7.25\" (1.708 m)          Physical Exam:  Physical Examination: General appearance - alert, well appearing, and in no distress  Mental status - alert, oriented to person, place, and time  Eyes - pupils equal and reactive, extraocular eye movements intact  Ears - bilateral TM's and external ear canals normal  Nose - normal and patent, no erythema, discharge or polyps  Mouth - mucous membranes moist, pharynx normal without lesions  Neck - supple, no significant adenopathy  Lymphatics - no palpable lymphadenopathy, no hepatosplenomegaly  Chest - clear to auscultation, no wheezes, rales or rhonchi, symmetric air entry  Heart - normal rate, regular rhythm, normal S1, S2, no murmurs, rubs, clicks or gallops  Abdomen - soft, nontender, nondistended, no masses or organomegaly  Back exam - full range of motion, no tenderness, palpable spasm or pain on motion  Neurological - alert, oriented, normal speech, no focal findings or movement disorder noted  Musculoskeletal - no joint tenderness, deformity or swelling  Extremities - peripheral pulses normal, no pedal edema, no clubbing or cyanosis  Skin - normal coloration and turgor, no rashes, no suspicious skin lesions noted    Patient Instructions      Before Your Surgery       Call your surgeon if there is any change in your health. This includes signs of a cold or flu (such as a sore throat, runny nose, cough, rash or fever).       Do not smoke, drink alcohol or take over the counter medicine (unless your surgeon or primary care doctor tells you to) for the 24 hours before and after surgery.       If you take prescribed drugs: Follow your doctor s orders about which medicines to take and which to stop until after surgery.      Eating and drinking prior to surgery: follow the instructions from your surgeon.      Take a shower or bath the night before surgery. Use the soap your " surgeon gave you to gently clean your skin. If you do not have soap from your surgeon, use your regular soap. Do not shave or scrub the surgery site. Wear clean pajamas and have clean sheets on your bed.             Hold all supplements, aspirin and NSAIDs for 7 days prior to surgery.    Follow your surgeon's direction on when to stop eating and drinking prior to surgery.    Remove all jewelry and metal piercings before your surgery.     Remove nail polish from fingers before surgery.      EKG: Done about a month ago was independently reviewed, did show some nonspecific ST-T wave normalities; follow-up stress test was negative.    Labs:  Recent Results (from the past 48 hour(s))   Basic Metabolic Panel    Collection Time: 11/26/19  3:36 PM   Result Value Ref Range    Sodium 140 136 - 145 mmol/L    Potassium 4.0 3.5 - 5.0 mmol/L    Chloride 106 98 - 107 mmol/L    CO2 26 22 - 31 mmol/L    Anion Gap, Calculation 8 5 - 18 mmol/L    Glucose 92 70 - 125 mg/dL    Calcium 9.3 8.5 - 10.5 mg/dL    BUN 11 8 - 28 mg/dL    Creatinine 0.81 0.60 - 1.10 mg/dL    GFR MDRD Af Amer >60 >60 mL/min/1.73m2    GFR MDRD Non Af Amer >60 >60 mL/min/1.73m2   HM2(CBC w/o Differential)    Collection Time: 11/26/19  3:36 PM   Result Value Ref Range    WBC 9.4 4.0 - 11.0 thou/uL    RBC 4.72 3.80 - 5.40 mill/uL    Hemoglobin 15.0 12.0 - 16.0 g/dL    Hematocrit 44.5 35.0 - 47.0 %    MCV 94 80 - 100 fL    MCH 31.8 27.0 - 34.0 pg    MCHC 33.8 32.0 - 36.0 g/dL    RDW 11.9 11.0 - 14.5 %    Platelets 176 140 - 440 thou/uL    MPV 8.3 7.0 - 10.0 fL        Immunization History   Administered Date(s) Administered     Hep A, Adult IM (19yr & older) 09/09/2019     Influenza [id number], 12/08/2009     Influenza high dose,seasonal,PF, 65+ yrs 12/12/2016, 11/21/2017     Influenza, inj, historic,unspecified 10/25/1999, 10/19/2010, 01/10/2013, 12/16/2013, 10/23/2015, 10/17/2019     Influenza,seasonal quad, PF, =/> 6months 10/10/2018     Influenza,seasonal, Inj IIV3  10/13/2011     Pneumo Conj 13-V (2010&after) 07/20/2015     Pneumo Polysac 23-V 08/10/2018     Tdap 02/16/2010, 09/09/2019           Electronically signed by Leah Garcia MD 11/27/19 2:46 PM

## 2021-06-03 NOTE — TELEPHONE ENCOUNTER
Left message to call back for: Patient  Information to relay to patient:  Please let pt know that unfortunately, Dr. Tolliver does not have any openings before her surgery date on 12/12 for a pre-op.  Please schedule with another provider that has openings.  Dr. Garcia has a number of openings prior to 12/12. Thanks!

## 2021-06-03 NOTE — PATIENT INSTRUCTIONS - HE
1) I agree with Dr. Dumas's plan to get the spots on the right side out  2) I will relay that you may wish to consider biopsy before surgery on the opposite side.  Lets see how well you tolerate the surgery and the pathology results before we conclude a sure path  3) If you have any more questions, let me know

## 2021-06-04 VITALS
SYSTOLIC BLOOD PRESSURE: 158 MMHG | WEIGHT: 142 LBS | BODY MASS INDEX: 21.52 KG/M2 | DIASTOLIC BLOOD PRESSURE: 80 MMHG | HEART RATE: 86 BPM | HEIGHT: 68 IN | OXYGEN SATURATION: 97 %

## 2021-06-04 VITALS
HEART RATE: 75 BPM | WEIGHT: 151.25 LBS | BODY MASS INDEX: 23.74 KG/M2 | SYSTOLIC BLOOD PRESSURE: 150 MMHG | DIASTOLIC BLOOD PRESSURE: 80 MMHG | TEMPERATURE: 98.1 F | HEIGHT: 67 IN | OXYGEN SATURATION: 98 %

## 2021-06-04 VITALS
HEART RATE: 80 BPM | BODY MASS INDEX: 21.76 KG/M2 | RESPIRATION RATE: 24 BRPM | OXYGEN SATURATION: 99 % | SYSTOLIC BLOOD PRESSURE: 138 MMHG | WEIGHT: 143.1 LBS | DIASTOLIC BLOOD PRESSURE: 82 MMHG

## 2021-06-04 VITALS
RESPIRATION RATE: 17 BRPM | DIASTOLIC BLOOD PRESSURE: 82 MMHG | HEART RATE: 66 BPM | OXYGEN SATURATION: 97 % | BODY MASS INDEX: 23.46 KG/M2 | SYSTOLIC BLOOD PRESSURE: 142 MMHG | WEIGHT: 152 LBS

## 2021-06-04 VITALS
BODY MASS INDEX: 21.45 KG/M2 | WEIGHT: 141.1 LBS | HEART RATE: 84 BPM | SYSTOLIC BLOOD PRESSURE: 148 MMHG | RESPIRATION RATE: 20 BRPM | OXYGEN SATURATION: 98 % | DIASTOLIC BLOOD PRESSURE: 84 MMHG

## 2021-06-04 VITALS
OXYGEN SATURATION: 98 % | SYSTOLIC BLOOD PRESSURE: 136 MMHG | RESPIRATION RATE: 18 BRPM | HEART RATE: 82 BPM | WEIGHT: 142 LBS | BODY MASS INDEX: 21.59 KG/M2 | DIASTOLIC BLOOD PRESSURE: 78 MMHG

## 2021-06-04 VITALS
SYSTOLIC BLOOD PRESSURE: 138 MMHG | WEIGHT: 142 LBS | RESPIRATION RATE: 12 BRPM | OXYGEN SATURATION: 94 % | BODY MASS INDEX: 21.52 KG/M2 | HEIGHT: 68 IN | DIASTOLIC BLOOD PRESSURE: 82 MMHG | HEART RATE: 85 BPM

## 2021-06-04 VITALS — BODY MASS INDEX: 22.22 KG/M2 | HEIGHT: 68 IN | WEIGHT: 146.6 LBS

## 2021-06-04 NOTE — PATIENT INSTRUCTIONS - HE
1) Your diagnosis is now blastomycosis  2) Its possible all of the nodules have been removed  3) I am not certain we need to do any treatment as it may be all gone  4) Lets recheck a Chest CT in 6 weeks to see how you have healed  5) I will call after the CT to see if we need to do anything else

## 2021-06-04 NOTE — TELEPHONE ENCOUNTER
Refill Approved    Rx renewed per Medication Renewal Policy. Medication was last renewed on 11/26/19  OV 12/19/19.    Cara Mayo, [facility] Triage/Med Refill 12/20/2019     Requested Prescriptions   Pending Prescriptions Disp Refills     lisinopril (PRINIVIL,ZESTRIL) 10 MG tablet [Pharmacy Med Name: LISINOPRIL 10 MG TABLET] 30 tablet 0     Sig: TAKE 1 TABLET BY MOUTH EVERY DAY       Ace Inhibitors Refill Protocol Passed - 12/19/2019  1:53 AM        Passed - PCP or prescribing provider visit in past 12 months       Last office visit with prescriber/PCP: Visit date not found OR same dept: Visit date not found OR same specialty: Visit date not found  Last physical: 11/26/2019 Last MTM visit: Visit date not found   Next visit within 3 mo: Visit date not found  Next physical within 3 mo: Visit date not found  Prescriber OR PCP: Leah Garcia MD  Last diagnosis associated with med order: 1. Hypertension, unspecified type  - lisinopril (PRINIVIL,ZESTRIL) 10 MG tablet [Pharmacy Med Name: LISINOPRIL 10 MG TABLET]; TAKE 1 TABLET BY MOUTH EVERY DAY  Dispense: 30 tablet; Refill: 0    If protocol passes may refill for 12 months if within 3 months of last provider visit (or a total of 15 months).             Passed - Serum Potassium in past 12 months     Lab Results   Component Value Date    Potassium 3.7 12/16/2019             Passed - Blood pressure filed in past 12 months     BP Readings from Last 1 Encounters:   12/19/19 158/80             Passed - Serum Creatinine in past 12 months     Creatinine   Date Value Ref Range Status   12/15/2019 0.73 0.60 - 1.10 mg/dL Final

## 2021-06-04 NOTE — ANESTHESIA CARE TRANSFER NOTE
Last vitals:   Vitals:    12/10/19 1731   BP: 155/73   Pulse: 68   Resp: 18   Temp:    SpO2: 99%     Patient's level of consciousness is drowsy  Spontaneous respirations: yes  Maintains airway independently: yes  Dentition unchanged: yes  Oropharynx: oropharynx clear of all foreign objects    QCDR Measures:  ASA# 20 - Surgical Safety Checklist: WHO surgical safety checklist completed prior to induction    PQRS# 430 - Adult PONV Prevention: 4558F - Pt received => 2 anti-emetic agents (different classes) preop & intraop  ASA# 8 - Peds PONV Prevention: NA - Not pediatric patient, not GA or 2 or more risk factors NOT present  PQRS# 424 - Mile-op Temp Management: 4559F - At least one body temp DOCUMENTED => 35.5C or 95.9F within required timeframe  PQRS# 426 - PACU Transfer Protocol: - Transfer of care checklist used  ASA# 14 - Acute Post-op Pain: ASA14B - Patient did NOT experience pain >= 7 out of 10

## 2021-06-04 NOTE — PROGRESS NOTES
HPI:    Patient is a 70 yo female who presents today for follow up of her hospitalization.  Patient was hospitalized from 12/10/2019-12/16/2019    Patient had a diagnosed of lung adenocarcinoma and had bilateral removal of three lung nodules.  Thus far, the pathology came back as granulomas and blastomycoses.  Patient has follow up with pulmonology next week.  She was discharged from the hospital Sunday night and had her chest tubes taken out.  Initially, she was using her incentive spirometer 3 times a day.  When she got home, she noted that the oxycodone made her too drowsy so she stopped taking it on Tuesday and has been managing with just ibuprofen and tylenol.  She did not tolerate the vicodin in the hospital as it made her vomit.    I noted that today she is breathing very shallowly and is very guarded in her movement.  She admitted that it did hurt to move.  Her BP is higher today, I believe, because she is in pain.  Patient admitted that she had not been using her IS because of the pain.  Discussed the risk of atelectasis and pneumonia if she does not use it.  Discussed the option of taking her oxycodone at half the dose three times a day so that she would not be as drowsy, but certainly have improved pain control.     Patient has only pain at the incisional sites.  No chest pain, edema, lightheadedness, palpitations.    Patient Active Problem List    Diagnosis Date Noted     S/P thoracotomy 12/10/2019     Hypertension 11/26/2019     Malignant neoplasm of lower lobe of right lung (H) 11/08/2019     Mixed hyperlipidemia 05/24/2017     Menopause      Joint Pain, Localized In The Knee      Medications from hospitalization reviewed.  Current Outpatient Medications:      acetaminophen (TYLENOL) 500 MG tablet, Take 1-2 tablets (500-1,000 mg total) by mouth every 4 (four) hours as needed., Disp: , Rfl: 0     atorvastatin (LIPITOR) 20 MG tablet, Take 1 tablet (20 mg total) by mouth at bedtime., Disp: 90 tablet, Rfl:  "0     diphenhydrAMINE (BENADRYL) 25 mg tablet, Take 1 tablet (25 mg total) by mouth every 6 (six) hours as needed for itching or allergies., Disp: , Rfl: 0     docusate sodium (COLACE) 100 MG capsule, Take 1 capsule (100 mg total) by mouth 2 (two) times a day as needed for constipation., Disp: , Rfl: 0     estradiol-norethindrone (ACTIVELLA) 1-0.5 mg per tablet, Take 0.5 tablets by mouth at bedtime. , Disp: , Rfl:      metoprolol tartrate (LOPRESSOR) 50 MG tablet, Take 1 tablet (50 mg total) by mouth 2 (two) times a day., Disp: 60 tablet, Rfl: 2     lisinopril (PRINIVIL,ZESTRIL) 10 MG tablet, Take 1 tablet (10 mg total) by mouth daily., Disp: 90 tablet, Rfl: 3     oxyCODONE (ROXICODONE) 10 MG immediate release tablet, Take 1 tablets (2.5 mg total) by mouth every 4 (four) hours as needed., Disp: 20 tablet, Rfl: 0      Objective     /80 (Patient Site: Left Arm, Patient Position: Sitting, Cuff Size: Adult Regular)   Pulse 86   Ht 5' 8\" (1.727 m)   Wt 142 lb (64.4 kg)   SpO2 97%   BMI 21.59 kg/m    General appearance: alert, appears stated age and cooperative  Neck: no adenopathy  Lungs: clear to auscultation bilaterally  Heart: regular rate and rhythm, S1, S2 normal, no murmur, click, rub or gallop   Chest; Incisional sites are clean, dry, and intact.    Extremities: extremities normal, atraumatic, no cyanosis or edema    Katey was seen today for hospital visit follow up.    Diagnoses and all orders for this visit:    S/P thoracotomy  -     oxyCODONE (ROXICODONE) 5 MG immediate release tablet; Take 0.5 tablets (2.5 mg total) by mouth every 4 (four) hours as needed.    Patient notes that she has 10 left of the oxycodone.  Recommend taking 1/2 a tablet three times a day to ease her pain so that she can breathe a little easier and use her IS.  Patient agreed with that.  Has follow up with Pulmonology next week.  Discussed gentle stretching exercises as her body has been rigid due to her excessive " yojana.    .

## 2021-06-04 NOTE — ANESTHESIA PREPROCEDURE EVALUATION
Anesthesia Evaluation      Patient summary reviewed   No history of anesthetic complications     Airway   Mallampati: II  Neck ROM: full   Pulmonary - normal exam                          Cardiovascular - normal exam  (+) hypertension, ,     ECG reviewed        Neuro/Psych      Endo/Other       GI/Hepatic/Renal            Dental - normal exam                        Anesthesia Plan  Planned anesthetic: general endotracheal    ASA 2   Induction: intravenous   Anesthetic plan and risks discussed with: patient  Anesthesia plan special considerations: antiemetics, arterial catheterization, IV therapy two IVs,   Post-op plan: routine recovery

## 2021-06-04 NOTE — TELEPHONE ENCOUNTER
Left message for patient to confirm that she can come in tomorrow at 4:20. If patient agrees I will add her to the schedule.    OVIDIO/DAVID

## 2021-06-04 NOTE — ANESTHESIA POSTPROCEDURE EVALUATION
Patient: Katey Singh  RIGHT THORACOSCOPIC WEDGE RESECTION/RIGHT THORACOSCOPIC SEGMENTECTOMY/LEFT THORACOSCOPIC WEDGE RESECTION  Anesthesia type: general    Patient location: PACU  Last vitals:   Vitals Value Taken Time   /81 12/10/2019  6:30 PM   Temp 36.7  C (98  F) 12/10/2019  6:15 PM   Pulse 70 12/10/2019  6:41 PM   Resp 13 12/10/2019  6:41 PM   SpO2 100 % 12/10/2019  6:41 PM   Vitals shown include unvalidated device data.  Post vital signs: stable  Level of consciousness: awake and responds to simple questions  Post-anesthesia pain: pain controlled  Post-anesthesia nausea and vomiting: no  Pulmonary: unassisted, return to baseline  Cardiovascular: stable and blood pressure at baseline  Hydration: adequate  Anesthetic events: no    QCDR Measures:  ASA# 11 - Mile-op Cardiac Arrest: ASA11B - Patient did NOT experience unanticipated cardiac arrest  ASA# 12 - Mile-op Mortality Rate: ASA12B - Patient did NOT die  ASA# 13 - PACU Re-Intubation Rate: ASA13B - Patient did NOT require a new airway mgmt  ASA# 10 - Composite Anes Safety: ASA10A - No serious adverse event    Additional Notes:

## 2021-06-04 NOTE — ANESTHESIA PROCEDURE NOTES
Arterial Line  Reason for Procedure: hemodynamic monitoring  Patient location during procedure: Pre-op  Start time: 12/10/2019 11:18 AM  End time: 12/10/2019 11:29 AM  Staffing:  Performing  Anesthesiologist: Alayna Leahy MD  Sterile Precautions:no barriers used during insertion: hand hygiene used.  Arterial Line:   Immediately prior to procedure a time out was called to verify the correct patient, procedure, equipment, support staff and site/side marked as required  Laterality: left  Location: radial  Prepped with: ChloroPrep    Needle gauge: 20 G  Number of Attempts: 1  Secured with: tape, transparent dressing and pressure dressing  Flushed with: saline  1% lidocaine local anesthesia used for skin prep.   See MAR for additional medications given.  Ultrasound evaluation of access site: yes  Vessel patent by US exam    Concurrent real time visualization of needle entry

## 2021-06-04 NOTE — TELEPHONE ENCOUNTER
Patient has been scheduled for a hospital f/u with Dr. Tolliver for 12/19/19 at 4:20pm.    OVIDIO/DAVID

## 2021-06-04 NOTE — PROGRESS NOTES
Assessment and Plan:Katey Singh is a 69 y.o. F with a past medical history significant for tobacco abuse, hypertension who presents to clinic today for follow up of lung surgery for presumed adenocarcinoma.  Upon excision of all three lung nodules the pathology was revised as her actual diagnosis is blastomycosis.  With enough inflammation, architectural distortion created a false positive result of adenocarcinoma on the initial needle biopsy.   As she no longer has the nodules, and was asymptomatic to begin, I'm not certain she requires any treatment.  I think she is recovering as expected from her surgery, and at this point I would like to reimage her lungs in 6 weeks to ensure there is no new nodules to suggest the blasto is still active.    1) Blastomycosis - s/p bilateral excision of all three nodules.  Biopsy proven with stain showing large yeast.  Of note she has wolfhounds that have gotten pneumonia before, and she has lost prior wolfhounds from pneumonia.  They have a cabin on the [facility].  2) Healing from lung surgery - struggling a little, but has turned the corner, working on IS, taking her pain medication as instructed, using her exercise machine.   3) Tobacco abuse - probably the best outcome of this course was that she has successfully quit smoking.  4) RTC in 2 months after CT is done.  If she is asymptomatic and CT is benign, this can be cancelled.      CCx: blastomycosis    HPI: Ms. Singh is a 69 year old female with a recent history of lung nodules that were PET positive and CT guided biopsy suggestive of adenocarcinoma.  She presents today as a hospital follow up and to discuss the results of her biopsy, which she was not completely aware of after her hospital stay.  She states the recovery has been hard.  She doesn't like taking the pain meds, but her primary doctor has informed her how important it is so she can take full breaths.  She is turning the corner and no  longer has pain on deep breaths.  She does get some pains on turning.  She has quit smoking.    ROS:  Review of Systems - History obtained from the patient  General ROS: negative  Psychological ROS: negative  ENT ROS: negative  Allergy and Immunology ROS: negative  Endocrine ROS: negative  Respiratory ROS: positive for - pleuritic pain  negative for - cough, hemoptysis, orthopnea, tachypnea or wheezing  Cardiovascular ROS: no chest pain or palpitations  Gastrointestinal ROS: no abdominal pain, change in bowel habits, or black or bloody stools  Genito-Urinary ROS: no dysuria, trouble voiding, or hematuria  Musculoskeletal ROS: negative  Neurological ROS: no TIA or stroke symptoms  Dermatological ROS: negative      Current Meds:  Current Outpatient Medications   Medication Sig     acetaminophen (TYLENOL) 500 MG tablet Take 1-2 tablets (500-1,000 mg total) by mouth every 4 (four) hours as needed.     atorvastatin (LIPITOR) 20 MG tablet Take 1 tablet (20 mg total) by mouth at bedtime.     diphenhydrAMINE (BENADRYL) 25 mg tablet Take 1 tablet (25 mg total) by mouth every 6 (six) hours as needed for itching or allergies.     docusate sodium (COLACE) 100 MG capsule Take 1 capsule (100 mg total) by mouth 2 (two) times a day as needed for constipation.     estradiol-norethindrone (ACTIVELLA) 1-0.5 mg per tablet Take 0.5 tablets by mouth at bedtime.      lisinopril (PRINIVIL,ZESTRIL) 10 MG tablet Take 1 tablet (10 mg total) by mouth daily.     metoprolol tartrate (LOPRESSOR) 50 MG tablet Take 1 tablet (50 mg total) by mouth 2 (two) times a day.     oxyCODONE (ROXICODONE) 5 MG immediate release tablet Take 0.5 tablets (2.5 mg total) by mouth every 4 (four) hours as needed.       Labs:  No results found for this or any previous visit (from the past 72 hour(s)).    I have personally reviewed all pertinent imaging studies and PFT results unless otherwise noted.    Imaging studies:  Xr Chest 1 View Portable    Result Date:  "12/10/2019  EXAM: XR CHEST 1 VIEW PORTABLE LOCATION: [facility] DATE/TIME: 12/10/2019 5:48 PM INDICATION: S/P VATs possible thoracotomy. COMPARISON: 10/28/2019.     Right chest tube tip at the apex with pneumothorax measuring 1.9 cm to the apex. Postoperative changes on the right with lung sutures with adjacent airspace disease and atelectasis. Subcutaneous emphysema. Left chest tube tip overlies left lower lobe. Adjacent pulmonary sutures and high density. Possible tiny left apical pneumothorax. Suboptimal ventilatory effort. Follow-up recommended. NOTE: ABNORMAL REPORT THE DICTATION ABOVE DESCRIBES AN ABNORMALITY FOR WHICH FOLLOW-UP IS NEEDED.     Xr Chest 2 Views    Result Date: 12/11/2019  EXAM: XR CHEST 2 VIEWS LOCATION: [facility] DATE/TIME: 12/11/2019 8:55 AM INDICATION: RIGHT VATS lower lobe segmentectomy (S9 and S10) and RIGHT middle lobe wedge resection, with possible thoracotomy COMPARISON: 12/10/2019     There is a new small left sided pneumothorax measuring approximately 1 cm at the apex. Inferiorly positioned left sided pleural drain remains in place. Minimal interstitial infiltrate at left lung base. No significant change in the modest residual right-sided hydropneumothorax measuring 2.8 cm at the apex but also measuring 5 cm at right costophrenic angle. Right-sided chest tube remains in good position. Opacity present involving right middle and lower lobes may relate to residual atelectatic lung or possible parenchymal infiltrate/pneumonia. Right-sided subcutaneous emphysema has increased. Heart size remains normal. NOTE: ABNORMAL REPORT THE DICTATION ABOVE DESCRIBES AN ABNORMALITY FOR WHICH FOLLOW-UP IS NEEDED.         Physical Exam:  /82   Pulse 85   Resp 12   Ht 5' 8\" (1.727 m)   Wt 142 lb (64.4 kg)   SpO2 94%   Breastfeeding No   BMI 21.59 kg/m    General - Well nourished  Ears/Mouth -  OP pink moist, no thrush  Neck - no cervical lymphadenopathy  Lungs - Clear " to ausculation bilaterally  CVS - regular rhythm with no murmurs, rubs or gallups  Abdomen - soft, NT, ND, NABS  Ext - no cyanosis, clubbing or edema  Skin - no rash  Psychology - alert and oriented, answers appropriate        Electronically signed by:    Dayday Meyer MD PhD  [facility] Pulmonary and Critical Care Medicine

## 2021-06-04 NOTE — TELEPHONE ENCOUNTER
New Appointment Needed  What is the reason for the visit:    Inpatient/ED Follow Up Appt Request  At what hospital or facility were you seen?: [facility]  What is the reason you were seen?: Lung re-section  What date were you admitted?: date: 12/10/19  What date were you discharged?: date: 12/16/19  What was the recommended timeframe for your follow up appointment?: 7 days   Provider Preference: PCP only  How soon do you need to be seen?: no later than 7 days   Waitlist offered?: No  Okay to leave a detailed message:  Yes

## 2021-06-05 NOTE — TELEPHONE ENCOUNTER
Refill Approved    Rx renewed per Medication Renewal Policy. Medication was last renewed on 10/24/19.    Cara Mayo, [facility] Triage/Med Refill 1/17/2020     Requested Prescriptions   Pending Prescriptions Disp Refills     atorvastatin (LIPITOR) 20 MG tablet [Pharmacy Med Name: ATORVASTATIN 20 MG TABLET] 90 tablet 0     Sig: TAKE 1 TABLET BY MOUTH EVERYDAY AT BEDTIME       [facility] Refill Protocol (Hmg CoA Reductase Inhibitors) Passed - 1/16/2020  4:08 AM        Passed - PCP or prescribing provider visit in past 12 months      Last office visit with prescriber/PCP: 12/19/2019 Miley Tolliver MD OR same dept: 12/19/2019 Miley Tolliver MD OR same specialty: 12/19/2019 Miley Tolliver MD  Last physical: 10/24/2019 Last MTM visit: Visit date not found   Next visit within 3 mo: Visit date not found  Next physical within 3 mo: Visit date not found  Prescriber OR PCP: Miley Tolliver MD  Last diagnosis associated with med order: 1. Hyperlipidemia LDL goal <130  - atorvastatin (LIPITOR) 20 MG tablet [Pharmacy Med Name: ATORVASTATIN 20 MG TABLET]; TAKE 1 TABLET BY MOUTH EVERYDAY AT BEDTIME  Dispense: 90 tablet; Refill: 0    If protocol passes may refill for 12 months if within 3 months of last provider visit (or a total of 15 months).

## 2021-06-05 NOTE — TELEPHONE ENCOUNTER
traZODone (DESYREL) 50 MG tablet [[id number]]     Electronically signed by: Miley Tolliver MD on 09/09/19 1514 Status: Discontinued   Ordering user: Miley Tolliver MD 09/09/19 1514 Authorized by: Miley Tolliver MD   Frequency:  09/09/19 - 10/24/19  Released by: Miley Tolliver MD 09/09/19 1514   Discontinued by: Miley Tolliver MD 10/24/19 1643 [Therapy completed

## 2021-06-06 NOTE — TELEPHONE ENCOUNTER
Shabnam called with complaint of a constant cough and severe rib pain on her right side. Has tried over the counter cough medication , has been given prednisone and antibiotics and has had no improvement. Will give cough medication with codeine per Dr. Davis instructed to let us know if it works for her and to be seen with her PCP, may need to have a CXR to see if she has fractured a rib. Said she would .

## 2021-06-06 NOTE — TELEPHONE ENCOUNTER
Medication Request  Medication name: Trazodone 50 mg  Requested Pharmacy: [id number]  Reason for request: Patient called the pharmacy requesting this prescription.  Pharmacist states the patient said it was previously at [facility].  This writer contacted [facility] and obtained the following information  Rx was written by Dr Tolliver for # 30 of the 50 mg, to take 1/2tab to 1 tab at bedtime  One and only fill was 9/9/19  When did you use medication last?:  unknown  Patient offered appointment:  NA  Okay to leave a detailed message: no

## 2021-06-06 NOTE — TELEPHONE ENCOUNTER
"History of lung resection Dec 2019.    \"Lots of residual coughing ever since.\"  Was given an inhaler, pred and antibx for upper resp infex by pulmonologist.    However no improvement.    New symptom 7 days ago:  \"Terrible pain\" in R side of chest.\"  Worsening daily.  Today pt states \"R arm hurts when reaching outward, causing pain in the upper back as well during reaching.\"    No known injury.  No reason to suspect fractured rib.    Advised ED eval to rule out PE.  Pt agrees to do so -> states intention to go to [facility].    Laura Sanabria RN  [facility] Triage     Reason for Disposition    Chest pain lasting longer than 5 minutes and ANY of the following:* Over 50 years old* Over 30 years old and at least one cardiac risk factor (i.e., high blood pressure, diabetes, high cholesterol, obesity, smoker or strong family history of heart disease)* Pain is crushing, pressure-like, or heavy * Took nitroglycerin and chest pain was not relieved* History of heart disease (i.e., angina, heart attack, bypass surgery, angioplasty, CHF)    Protocols used: CHEST PAIN-A-OH      "

## 2021-06-06 NOTE — PROGRESS NOTES
Patient instructed in use of albuterol hfa.  Patient states good understanding of how to use the inhaler device.  Return demo completed in clinic with good technique demonstrated.  Printed instructions as well as phone numbers to call with any questions sent home with patient.

## 2021-06-06 NOTE — TELEPHONE ENCOUNTER
Medication:  Disp Refills Start End SUNIL    traZODone (DESYREL) 50 MG tablet (Discontinued) 30 tablet 0 9/9/2019 10/24/2019 No   Sig: Take 1/2 a tablet to 1 tablet each night as needed.         Pharmacy:[id number] - [place], [place] [facility]    Last Office Visit:12/19/2019

## 2021-06-06 NOTE — PROGRESS NOTES
Pulmonary Clinic Follow-up Visit    Assessment & Plan:  69 y F with prior lung nodules, s/p lung resection in Dec for what turned out to be blasto, who presents for a cough and URI symptoms for the last 2 weeks. No fevers, with clear CXR, overall consistent with viral process. Re-assured her of this today. No current indication for antibiotics, recommended supportive care, rest, pushing fluids. No baseline chronic lung disease based on PFTs.     PLAN:    5 day course of guaifenesin w codeine prescribed for nighttime cough symptoms    Prn albuterol inhaler for intermittent chest tightness, no wheezing today    She will call clinic and let us know if she does not continue to improve.      Follow-up with Dr. Meyer as scheduled.      Shayna Madison MD  Pulmonary and Critical Care Medicine  [facility]  Office: [phone]  Pager: [phone]    ----------------------------    CCx: Cough    HPI:   69 y F patient of Dr. Meyer, s/p lung resection in Dec for what turned out to be blasto. She follows up today due to persistent URI symptoms with cough. She saw Dr. Meyer for the same ~1 week ago, and took a course of prednisone which helped the chest tightness, she is not coughing up as much phleg, but cough is keeping her up at night. She took  cough medication w codeine which helped.    She had a CXR prior to the visit, which was stable - unchanged from prior, no infiltrates. She denies fevers. Feels like she has a bronchitis that is running its course. She is leaving for [place] soon and wanted to be evaluated.    --------------------  69 y.o. F with a past medical history significant for blastomycosis who presents to clinic today for follow up after multiple wedge resections for presumed cancer.  She had a biopsy positive for cancer, but after resection, it was shown to be blastomycosis, that is now s/p resection.  She has had a follow up CT to show that no new nodules have grown.  She is battling a mild  "\"exacerbation\" after a cold that is likely lingering after having diminished lung capacity from her surgery.    ROS:  A 12-system review was obtained and was negative with the exception of the symptoms endorsed in the history of present illness.    PMH:  Past Medical History:   Diagnosis Date     Hypertension      Current Meds:  Current Outpatient Medications   Medication Sig Dispense Refill     atorvastatin (LIPITOR) 20 MG tablet TAKE 1 TABLET BY MOUTH EVERYDAY AT BEDTIME 90 tablet 3     estradiol-norethindrone (ACTIVELLA) 1-0.5 mg per tablet Take 0.5 tablets by mouth at bedtime.        traZODone (DESYREL) 50 MG tablet Take 1/2 to 1 tablet each night prn. 90 tablet 1     metoprolol tartrate (LOPRESSOR) 25 MG tablet Take 1 tablet (25 mg total) by mouth 2 (two) times a day for 7 days. 14 tablet 0     No current facility-administered medications for this visit.      Physical Exam:  /84   Pulse 84   Resp 20   Wt 141 lb 1.6 oz (64 kg)   SpO2 98% Comment: RA  BMI 21.45 kg/m    Gen: Alert, oriented, no distress, dry hacking cough  HEENT: nasal turbinates are unremarkable, no oropharyngeal lesions, no cervical or supraclavicular lymphadenopathy  CV: RRR, no M/G/R  Resp: CTAB, no focal crackles or wheezes  Abd: soft, nontender, no palpable organomegaly  Skin: no apparent rashes  Ext: no cyanosis, clubbing or edema  Neuro: alert, nonfocal    Labs:  Reviewed    Imaging studies:  Personally reviewed:    2/20/20 CXR:  Personally reviewed and interpreted:  Postop change in the lower lungs. Some stable small right pleural effusion or pleural thickening right lung base. No acute new findings.          "

## 2021-06-06 NOTE — PROGRESS NOTES
"Assessment and Plan:Katey Singh is a 69 y.o. F with a past medical history significant for blastomycosis who presents to clinic today for follow up after multiple wedge resections for presumed cancer.  She had a biopsy positive for cancer, but after resection, it was shown to be blastomycosis, that is now s/p resection.  She has had a follow up CT to show that no new nodules have grown.  She is battling a mild \"exacerbation\" after a cold that is likely lingering after having diminished lung capacity from her surgery.    1) Cough - likely recovering from a viral bronchitis with diminished lung capacity,  Will try 9 day course of prednisone to improve the recovery time and restore some energy  2) Blastomycosis - appears resolved through surgery.    3) s/p multiple lung resection  - will recheck PFTs after 6 months from now to see if there is a diminished functional capacity.  She did not technically have COPD prior to surgery given restored airflows, however she is not immune from developing COPD in time despite stopping smoking.  4) RTC in 6 months           CCx: blastomycosis    HPI: Ms. Singh returns for follow up.  She has a history of blastomycosis that was initially thought to be an adenocarcinoma given a biopsy result.  She is s/p bilateral resections of nodules, which were all seen to be blastomycosis.  She initially struggled with recovery after an 8 day hospitalization but then starting improving at home until she got a cold last week.  She feels it is still stuck in her chest and has set her back on recovery.  She denies fever or chills, she just has a deep cough and cant get enough air behind her cough to have a satisfying cough.  She doesn't have chest pains anymore.  She does have a slight weeping excoriation on her right lower abdomen from a drain that still hasn't closed up.  She isn't worried about it.    ROS:  Review of Systems - History obtained from the patient  General ROS: " negative  Psychological ROS: negative  ENT ROS: negative  Allergy and Immunology ROS: negative  Endocrine ROS: negative  Respiratory ROS: positive for - cough and shortness of breath  negative for - hemoptysis, orthopnea or pleuritic pain  Cardiovascular ROS: no chest pain or palpitations  Gastrointestinal ROS: no abdominal pain, change in bowel habits, or black or bloody stools  Genito-Urinary ROS: no dysuria, trouble voiding, or hematuria  Musculoskeletal ROS: negative  Neurological ROS: no TIA or stroke symptoms  Dermatological ROS: negative      Current Meds:  Current Outpatient Medications   Medication Sig     atorvastatin (LIPITOR) 20 MG tablet TAKE 1 TABLET BY MOUTH EVERYDAY AT BEDTIME     estradiol-norethindrone (ACTIVELLA) 1-0.5 mg per tablet Take 0.5 tablets by mouth at bedtime.      metoprolol tartrate (LOPRESSOR) 25 MG tablet Take 1 tablet (25 mg total) by mouth 2 (two) times a day for 7 days.     predniSONE (DELTASONE) 10 mg tablet Take 30 mg by mouth daily for 3 days, THEN 20 mg daily for 3 days, THEN 10 mg daily for 3 days.       Labs:  No results found for this or any previous visit (from the past 72 hour(s)).    I have personally reviewed all pertinent imaging studies and PFT results unless otherwise noted.    Imaging studies:  Ct Chest Without Contrast    Result Date: 1/29/2020  EXAM: CT CHEST WO CONTRAST LOCATION: [facility] DATE/TIME: 1/29/2020 10:09 AM INDICATION: Pneumonia COMPARISON: CT of the chest 09/17/2019; PET/CT 10/10/2019; chest radiographs 1/16/2020 and 12/16/2019 TECHNIQUE: CT chest without IV contrast. Multiplanar reformats were obtained. Dose reduction techniques were used. CONTRAST: None. FINDINGS: LUNGS AND PLEURA: Surgical resection staple lines are present in the basilar right lung and single staple line in the anterior left lower lobe. There is associated bands of cicatricial atelectasis. Mild residual right basilar pleural fluid/thickening. Minimal left pleural   effusion layering into the posterior sulcus. Background of upper zone predominant centrilobular emphysema. There are no airspace opacities. No global or regional airway wall thickening. MEDIASTINUM/AXILLAE: Cardiac chambers are normal in size. No pericardial effusion. Normal caliber thoracic aorta. Fatty replaced thymus in the anterior mediastinum. No discrete enlarged mediastinal or hilar lymph nodes. UPPER ABDOMEN: No significant finding. MUSCULOSKELETAL: Diffuse but small thoracic spine degenerative osteophytes. No fractures or aggressive bone lesions.     1.  Multiple staple lines in the basilar right lung from wedge resections and single staple line in the anterior left lower lobe. Related cicatricial atelectasis. 2.  Minimal bilateral pleural effusions. 3.  No findings to suggest an acute airspace infectious or inflammatory process 4.  Emphysema.         Physical Exam:  /78   Pulse 82   Resp 18   Wt 142 lb (64.4 kg)   SpO2 98% Comment: RA  BMI 21.59 kg/m    General - Well nourished  Ears/Mouth -  OP pink moist, no thrush  Neck - no cervical lymphadenopathy  Lungs - Clear to ausculation bilaterally   CVS - regular rhythm with no murmurs, rubs or gallups  Abdomen - soft, NT, ND, NABS  Ext - no cyanosis, clubbing or edema  Skin - no rash  Psychology - alert and oriented, answers appropriate        Electronically signed by:    Dayday Meyer MD PhD  [facility] Pulmonary and Critical Care Medicine

## 2021-06-06 NOTE — PATIENT INSTRUCTIONS - HE
1) I think you are making progress, albeit slow  2) The cold has set you back, lets try a course of prednisone to kick start your recovery  3) I will see you back in 6 months to check in and see what your lung function is up to

## 2021-06-06 NOTE — TELEPHONE ENCOUNTER
Call from [facility].  States she saw Dr. Meyer on 2/10 and was prescribed a 9 day coarse of Prednisone.  She has completed that and is continuing to cough just as bad if not worse.  Also, her phlegm color has changed from clear to yellowish.  Is concerned because she is leaving on a flight to [facility] this coming Monday.  Asking to see Dr. Meyer prior to leaving.      Will schedule for tomorrow.

## 2021-06-07 ENCOUNTER — AMBULATORY - HEALTHEAST (OUTPATIENT)
Dept: PULMONOLOGY | Facility: OTHER | Age: 71
End: 2021-06-07

## 2021-06-07 DIAGNOSIS — J44.9 CHRONIC OBSTRUCTIVE PULMONARY DISEASE, UNSPECIFIED COPD TYPE (H): ICD-10-CM

## 2021-06-07 NOTE — TELEPHONE ENCOUNTER
Spoke to patient.  Metoprolol was stopped by pulmonology due to feeling low energy and short of breath on metoprolol.  That was started in patient.  Fatigue improved.  Lisinopril was refilled at [facility] in February, but patient ran out in January and switched pharmacies to [facility] then, so was not aware of the refill.  BP this am:  165/97.    Recommend restarting her lisinopril at 10 mg today, one tomorrow and mychart with BPs tomorrow.  May need to increase to 20 mg.   [facility]    Patient voiced understanding.

## 2021-06-08 ENCOUNTER — COMMUNICATION - HEALTHEAST (OUTPATIENT)
Dept: MULTI SPECIALTY CLINIC | Facility: CLINIC | Age: 71
End: 2021-06-08

## 2021-06-08 ENCOUNTER — AMBULATORY - HEALTHEAST (OUTPATIENT)
Dept: PULMONOLOGY | Facility: OTHER | Age: 71
End: 2021-06-08

## 2021-06-08 DIAGNOSIS — J44.9 CHRONIC OBSTRUCTIVE PULMONARY DISEASE, UNSPECIFIED COPD TYPE (H): ICD-10-CM

## 2021-06-08 NOTE — TELEPHONE ENCOUNTER
Who is calling:  Patient   Reason for Call:  Added a same day Lab appointment today 5/14/20 at 1:40pm  Date of last appointment with primary care:   Okay to leave a detailed message: No return call needed.

## 2021-06-09 NOTE — TELEPHONE ENCOUNTER
Refill Approved    Rx renewed per Medication Renewal Policy. Medication was last renewed on 4/10/20.    Marcia Camarena, [facility] Triage/Med Refill 6/29/2020     Requested Prescriptions   Pending Prescriptions Disp Refills     amLODIPine (NORVASC) 5 MG tablet [Pharmacy Med Name: AMLODIPINE BESYLATE 5MG TABLETS] 90 tablet 0     Sig: TAKE 1 TABLET(5 MG) BY MOUTH DAILY       Calcium-Channel Blockers Protocol Passed - 6/27/2020  9:36 AM        Passed - PCP or prescribing provider visit in past 12 months or next 3 months     Last office visit with prescriber/PCP: 12/19/2019 Miley Tolliver MD OR same dept: 12/19/2019 Miley Tolliver MD OR same specialty: 12/19/2019 Miley Tolliver MD  Last physical: 10/24/2019 Last MTM visit: Visit date not found   Next visit within 3 mo: Visit date not found  Next physical within 3 mo: Visit date not found  Prescriber OR PCP: Miley Tolliver MD  Last diagnosis associated with med order: 1. Hypertension, unspecified type  - amLODIPine (NORVASC) 5 MG tablet [Pharmacy Med Name: AMLODIPINE BESYLATE 5MG TABLETS]; TAKE 1 TABLET(5 MG) BY MOUTH DAILY  Dispense: 90 tablet; Refill: 0    If protocol passes may refill for 12 months if within 3 months of last provider visit (or a total of 15 months).             Passed - Blood pressure filed in past 12 months     BP Readings from Last 1 Encounters:   03/05/20 (!) 175/99

## 2021-06-10 NOTE — TELEPHONE ENCOUNTER
Refill Approved    Rx renewed per Medication Renewal Policy. Medication was last renewed on 2/12/20.    Marcia Camarena, [facility] Triage/Med Refill 8/26/2020     Requested Prescriptions   Pending Prescriptions Disp Refills     traZODone (DESYREL) 50 MG tablet [Pharmacy Med Name: TRAZODONE 50MG TABLETS] 90 tablet 1     Sig: TAKE 1/2 TO 1 TABLET BY MOUTH EVERY NIGHT AS NEEDED       Tricyclics/Misc Antidepressant/Antianxiety Meds Refill Protocol Passed - 8/24/2020 12:52 PM        Passed - PCP or prescribing provider visit in last year     Last office visit with prescriber/PCP: 12/19/2019 Miley Tolliver MD OR same dept: 12/19/2019 Miley Tolliver MD OR same specialty: 12/19/2019 Miley Tolliver MD  Last physical: 10/24/2019 Last MTM visit: Visit date not found   Next visit within 3 mo: Visit date not found  Next physical within 3 mo: Visit date not found  Prescriber OR PCP: Miley Tolliver MD  Last diagnosis associated with med order: 1. Other insomnia  - traZODone (DESYREL) 50 MG tablet [Pharmacy Med Name: TRAZODONE 50MG TABLETS]; TAKE 1/2 TO 1 TABLET BY MOUTH EVERY NIGHT AS NEEDED  Dispense: 90 tablet; Refill: 1    If protocol passes may refill for 12 months if within 3 months of last provider visit (or a total of 15 months).

## 2021-06-10 NOTE — PROGRESS NOTES
"Katey Singh is a 70 y.o. female who is being evaluated via a billable telephone visit.      The patient has been notified of following:     \"This telephone visit will be conducted via a call between you and your physician/provider. We have found that certain health care needs can be provided without the need for a physical exam.  This service lets us provide the care you need with a short phone conversation.  If a prescription is necessary we can send it directly to your pharmacy.  If lab work is needed we can place an order for that and you can then stop by our lab to have the test done at a later time.    Telephone visits are billed at different rates depending on your insurance coverage. During this emergency period, for some insurers they may be billed the same as an in-person visit.  Please reach out to your insurance provider with any questions.    If during the course of the call the physician/provider feels a telephone visit is not appropriate, you will not be charged for this service.\"    Patient has given verbal consent to a Telephone visit? Yes    What phone number would you like to be contacted at? [phone]    Patient would like to receive their AVS by AVS Preference: Ana M.    Additional provider notes: Ms. Singh is a 70 year old female with a history of blastomycosis s/p resection of multiple nodules.  She states today she feels completely normal.  She helped cut up and load a tree at her cabin yesterday and had no issues.  She is not smoking.    We were planning on doing PFTs at some point in the future but these remain COVID pending as our lab is for urgent cases only.    Study Result     EXAM: CTA CHEST PE RUN  LOCATION: [facility]  DATE/TIME: 3/5/2020 11:58 AM     INDICATION:  Pleuritic right lower chest pain, recent air travel, status post right lower, middle and left lower lobe wedge resections 2 months ago  for benign, necrotizing granulomatous infection.  COMPARISON: " 01/29/2020 and multiple older studies  TECHNIQUE: CT angiogram chest during arterial phase injection IV contrast. 2D and 3D MIP reconstructions were performed by the CT technologist. Dose reduction techniques were used.   CONTRAST: Iohexol (Omni) 100 mL     FINDINGS:  ANGIOGRAM CHEST: Focal opacification of pulmonary arteries and branches. No evidence of pulmonary emboli. Thoracic aorta is normal caliber.     LUNGS AND PLEURA: Surgical changes noted in the right middle and both lower lobes. Moderate emphysema. No new parenchymal disease.     MEDIASTINUM/AXILLAE: No adenopathy.     UPPER ABDOMEN: Unremarkable.     MUSCULOSKELETAL: Fractures or suspicious lesions.     IMPRESSION:   1.  No evidence of pulmonary emboli. No abnormalities noted to explain pain.  2.  Moderate emphysema.  3.  Stable postthoracotomy changes with wedge resection in both lower lobes and right middle lobe.     1) Emphysema - not clear if she has COPD, can wait for pandemic to end before getting PFTs  - reschedule for 6-8 months    Phone call duration: 12 minutes    Alice Escoto LPN

## 2021-06-11 ENCOUNTER — HOSPITAL ENCOUNTER (OUTPATIENT)
Dept: CT IMAGING | Facility: HOSPITAL | Age: 71
Discharge: HOME | End: 2021-06-11
Attending: INTERNAL MEDICINE
Payer: COMMERCIAL

## 2021-06-11 DIAGNOSIS — F17.211 CIGARETTE NICOTINE DEPENDENCE IN REMISSION: ICD-10-CM

## 2021-06-11 NOTE — PROGRESS NOTES
FEMALE ADULT PREVENTIVE EXAM    CHIEF COMPLAINT:  Female preventive exam.    SUBJECTIVE:  Katey Singh is a 66 y.o. female who presents for her routine physical exam.  Patient would like to address the following concerns today:     1) Benign essential hypertension:  Low BP today.  Will hold medication for now and check CBC for anemia.  Will recheck BP in 6 weeks.      2) Seborrheic keratosis:  One keratosis improved dramatically, nothing left.  The one on left side came back crusting.  She would like cryotherapy today.    3) Had a stressful year last year, had a frivolous suit against their company last year.  The case was dismissed.  During that time, picked up smoking again x 5 months, but quit this past January once again.    GYNE HISTORY  Menses: menopausal  Sexually Active: with   Contraception: Taking Activella daily - managed by Dr. Chen - had joint pain and had hot flashes that are improved on hormone.  Discussed concerns for clot with even her intermittent smoking.  Last Pap: 2016 with Dr. Chen at [facility].    Abnormal Pap: none  Vaginal Discharge: no      She  has a past medical history of Hypertension.    Lab Results   Component Value Date    WBC 8.6 06/14/2017    HGB 14.7 06/14/2017    HCT 44.0 06/14/2017    MCV 94 06/14/2017     06/14/2017     06/05/2017    K 5.0 06/05/2017    BUN 13 06/05/2017     Lab Results   Component Value Date    CHOL 242 (H) 06/05/2017    HDL 52 06/05/2017    LDLCALC 163 (H) 06/05/2017    TRIG 134 06/05/2017     No results found for: TSH  BP Readings from Last 3 Encounters:   06/14/17 98/58   07/01/16 134/74   06/17/16 126/81       Surgeries:    Past Surgical History:   Procedure Laterality Date     AZ LIGATE FALLOPIAN TUBE      Description: Tubal Ligation;  Proc Date: 01/01/1987;       Family History:  Her family history includes Cancer (age of onset: 72) in her father; Cancer (age of onset: 76) in her sister; Diabetes in her brother; Heart disease  in her brother and maternal aunt; Hyperlipidemia in her brother; Hypertension in her brother, maternal aunt, maternal aunt, maternal aunt, mother, and sister. There is no history of Colon cancer, Breast cancer, or Clotting disorder.    Social History:  She  reports that she has quit smoking. She has never used smokeless tobacco. She reports that she drinks alcohol. She reports that she does not use illicit drugs.    Medications:    Current Outpatient Prescriptions:      estradiol-norethindrone (ACTIVELLA) 1-0.5 mg per tablet, Take 0.5 tablets by mouth daily., Disp: , Rfl:      aspirin 81 MG EC tablet, Take 81 mg by mouth daily., Disp: , Rfl:   HELD MEDICATIONS: None.    Allergies:  No latex allergies.  Allergies   Allergen Reactions     Penicillins Anaphylaxis            RISK BEHAVIOR & HEALTH HABITS  Self Breast Exam: discussed..  Regular Exercise: walks 1-2 miles daily with dogs.  Building a lot.  [place]  .  Balanced diet: low sugar, not a lot fruit  Fish four times a week.  Red meat once a week, vegetarian twice a week.  Does a lot of legumes.  Seat Belt Use: yes  Calcium intake/Osteoporosis prevention: Not taking supplement, does a lot of cheese, heavy whipping cream on cereal in the morning.  .  Dexa: 2013 - normal  Colonoscopy:2015 - small polyp - normal, repeat in 10 years  Mammogram: 8/2016 - normal.    Guns: NO  Sun Screen: YES  Dental Care: YES    REVIEW OF SYSTEMS:  Complete head to toe review of systems is otherwise negative except as above.    OBJECTIVE:  VITAL SIGNS:    Vitals:    06/14/17 1327   BP: 98/58   Pulse: (!) 59   SpO2: 99%     GENERAL:  Patient alert, in no acute distress.  EYES: PERRLA. Extraoccular movements intact, pupils equal, reactive to light and accommodation.  Normal conjunctiva and lids.  Undilated fudoscopic exam normal, including normal size, appearance cup-to-disc ratio.  Normal posterior segments, including no exudates or  hemorrhages.  ENT:  Hearing grossly normal.  Normal appearance to ears and nose.  Bilateral TM s, external canals, oropharynx normal. Normal lips, gums and teeth.  Normal nasal mucosa, septum and turbinates.  NECK:  Supple, without thyromegaly or mass.  RESP:  Clear to auscultation without crackles, wheezes or distress.  Normal respiratory effort.   CV:  Regular rate and rhythm without murmurs, rubs or gallops.  Normal carotid, abdominal aorta, femoral and pedal pulses.  No varicosities or edema.  ABDOMEN:  Soft, non-tender, without hepatosplenomegaly, masses, or hernias.   BREASTS:  With Dr. Chen.   :  With Dr. Chen.    LYMPHATIC: Normal palpation of neck, groin and axilla..  No lymphadenopathy.  No bruising.  NEURO:  CN II-XII intact, motor & sensory function all intact.  DTR and reflexes normal.  PSYCHIATRIC:  Alert & oriented with normal mood and affect.  Good judgment and insight.  SKIN:  Normal inspection and palpation.  Small 7 mm seborrheic keratosis on left trunk.    MUSCULOSKELETAL: Normal gait and station.  - Spine / Ribs / Pelvis: Normal inspection, ROM, stability and strength: Spine, Head, Neck, Upper and Lower Extremities.    ASSESSMENT & PLAN  Katey was seen today for annual exam, results and medication refill.    Diagnoses and all orders for this visit:    Routine general medical examination at a health care facility  Reviewed current history for screening.  Patient has breast and pelvic exam at [facility].  Discussed zoster vaccine - patient declines.    Benign essential hypertension  -     HM1 (CBC and Differential)  -     HM1 (CBC with Diff)  Will discontinue lisinopril and recheck in 6 weeks.   Her BP is actually a bit low.    Discussed new JNC8 recommendations.    Visit for screening mammogram    Mixed hyperlipidemia  ASCVD risk is 4.9% over 10 years, so unlikely to benefit from statin, so long as she continues to not smoke.  Patient improved her TGs from last year and they are now  normal.

## 2021-06-14 NOTE — TELEPHONE ENCOUNTER
RN cannot approve Refill Request    RN can NOT refill this medication PCP messaged that patient is overdue for Office Visit. Last office visit: 12/19/2019 Miley Tolliver MD Last Physical: 10/24/2019 Last MTM visit: Visit date not found Last visit same specialty: 12/19/2019 Miley Tolliver MD.  Next visit within 3 mo: Visit date not found  Next physical within 3 mo: Visit date not found      Ansley Villalta, [facility] Triage/Med Refill 1/3/2021    Requested Prescriptions   Pending Prescriptions Disp Refills     amLODIPine (NORVASC) 5 MG tablet [Pharmacy Med Name: AMLODIPINE BESYLATE 5MG TABLETS] 90 tablet 1     Sig: TAKE 1 TABLET(5 MG) BY MOUTH DAILY       Calcium-Channel Blockers Protocol Failed - 1/2/2021  8:59 AM        Failed - PCP or prescribing provider visit in past 12 months or next 3 months     Last office visit with prescriber/PCP: 12/19/2019 Miley Tolliver MD OR same dept: Visit date not found OR same specialty: 12/19/2019 Miley Tolliver MD  Last physical: 10/24/2019 Last MTM visit: Visit date not found   Next visit within 3 mo: Visit date not found  Next physical within 3 mo: Visit date not found  Prescriber OR PCP: Miley Tolliver MD  Last diagnosis associated with med order: 1. Hypertension, unspecified type  - amLODIPine (NORVASC) 5 MG tablet [Pharmacy Med Name: AMLODIPINE BESYLATE 5MG TABLETS]; TAKE 1 TABLET(5 MG) BY MOUTH DAILY  Dispense: 90 tablet; Refill: 1    If protocol passes may refill for 12 months if within 3 months of last provider visit (or a total of 15 months).             Passed - Blood pressure filed in past 12 months     BP Readings from Last 1 Encounters:   03/05/20 (!) 175/99

## 2021-06-15 PROBLEM — E78.2 MIXED HYPERLIPIDEMIA: Status: ACTIVE | Noted: 2017-05-24

## 2021-06-15 NOTE — TELEPHONE ENCOUNTER
RN cannot approve Refill Request    RN can NOT refill this medication Protocol failed and NO refill given. Last office visit: 12/19/2019 Miley Tolliver MD Last Physical: 10/24/2019 Last MTM visit: Visit date not found Last visit same specialty: 12/19/2019 Miley Tolliver MD.  Next visit within 3 mo: Visit date not found  Next physical within 3 mo: Visit date not found      Fidel Bruno, [facility] Triage/Med Refill 2/4/2021    Requested Prescriptions   Pending Prescriptions Disp Refills     atorvastatin (LIPITOR) 20 MG tablet [Pharmacy Med Name: ATORVASTATIN 20MG TABLETS] 90 tablet 3     Sig: TAKE 1 TABLET BY MOUTH EVERY NIGHT AT BEDTIME       Statins Refill Protocol (Hmg CoA Reductase Inhibitors) Failed - 2/4/2021  3:11 AM        Failed - PCP or prescribing provider visit in past 12 months      Last office visit with prescriber/PCP: 12/19/2019 Miley Tolliver MD OR same dept: Visit date not found OR same specialty: 12/19/2019 Miley Tolliver MD  Last physical: 10/24/2019 Last MTM visit: Visit date not found   Next visit within 3 mo: Visit date not found  Next physical within 3 mo: Visit date not found  Prescriber OR PCP: Miley Tolliver MD  Last diagnosis associated with med order: 1. Hyperlipidemia LDL goal <130  - atorvastatin (LIPITOR) 20 MG tablet [Pharmacy Med Name: ATORVASTATIN 20MG TABLETS]; TAKE 1 TABLET BY MOUTH EVERY NIGHT AT BEDTIME  Dispense: 90 tablet; Refill: 3    If protocol passes may refill for 12 months if within 3 months of last provider visit (or a total of 15 months).

## 2021-06-16 PROBLEM — G43.909 MIGRAINE: Status: ACTIVE | Noted: 2020-11-05

## 2021-06-16 PROBLEM — J98.4 CALCIFIED GRANULOMA OF LUNG: Status: ACTIVE | Noted: 2019-11-08

## 2021-06-16 PROBLEM — Z98.890 S/P THORACOTOMY: Status: ACTIVE | Noted: 2019-12-10

## 2021-06-16 PROBLEM — I10 HYPERTENSION: Status: ACTIVE | Noted: 2019-11-26

## 2021-06-16 NOTE — TELEPHONE ENCOUNTER
Telephone Encounter by Nia Ordonez at 9/12/2019  1:29 PM     Author: Nia Ordonez Service: -- Author Type: --    Filed: 9/12/2019  1:31 PM Encounter Date: 9/12/2019 Status: Signed    : Nia Ordonez APPROVED:    Approval start date:06/14/2019  Approval end date:03/10/2020    Pharmacy has been notified of approval and will contact patient when medication is ready for pickup.

## 2021-06-16 NOTE — TELEPHONE ENCOUNTER
Telephone Encounter by Regine Patel LPN at 12/18/2019  4:09 PM     Author: Regine Patel LPN Service: -- Author Type: Licensed Nurse    Filed: 12/18/2019  4:10 PM Encounter Date: 12/16/2019 Status: Signed    : Regine Patel LPN (Licensed Nurse)       Patient Returning Call  Reason for call:  Patient returning call   Information relayed to patient:  Aleta Leija, TONYA           4:08 PM   Note      Left message for patient to confirm that she can come in tomorrow at 4:20. If patient agrees I will add her to the schedule.     GJ/RMA       Patient has additional questions:  No  If YES, what are your questions/concerns:  Patient agrees to come tomorrow at 4:20 .   Okay to leave a detailed message?: No

## 2021-06-17 NOTE — PATIENT INSTRUCTIONS - HE
1) You do have COPD based on your PFTs, it is in the moderate category, but likely causing only a little symptoms  2) If you would like to start an inhaler for this we can, but it is not mandatory at this point  3) We will get you back on the lung cancer screening test starting now  4) I'll see you back in a year

## 2021-06-17 NOTE — PROGRESS NOTES
Assessment and Plan:Katey Singh is a 70 y.o. female with a past medical history significant for blastomycosis diagnosed by lung resection given a presumptive needle biopsy showing cancer who presents to clinic today for follow up.  She finally had her PFTs done to screen for COPD given moderate emphysema on her CTs.  She presents to discuss this today..     1) COPD - per her PFTs she has moderate COPD, but relatively asymptomatic.  She is not quite ready to start spiriva or incruse, but she would like to think on it.  2) History of Blastomycosis - considered resolved, no symptoms  3) Nicotine dependence - it has been a year since her last CT and she is still eligible for screening.  Lung Cancer Screening pre-scan counseling Visit    The patient fits the risk profile of patients who benefit from this screening:  -The patient is >55 years old and <80 years old  -The patient has 50 pack year history (over 30)  -The patient has smoked within the past 15 years  -The patient has no medical comorbidity severe enough that it would cause mortality prior to mortality due to the lung cancer attempting to be detected.    Discussion with patient regarding the harms associated with LDCT screening include false-negative and false-positive results, incidental findings, overdiagnosis, and radiation exposure were reviewed at length.   The patient understands that pursuing this screening test may result in a biopsy that was not necessary. It may also produced added stress over a nodule that is likely not cancer.    Of 100 patients who get screening, 25 will have a positive scan. Of those 25, only 1 will have cancer.  Overdiagnosis is estimated at 10% of patients-- they would not have been detected in the patient's lifetime without screening. Less than 1% of patients likely had death related to radiation exposure increase.   Average low-dose CT associated with 0.61 to 1.5 mSv. Annual background radiation exposure in the [place] averages 2.4 mS; mammogram is 0.7mSv.    The benefits are reduction in risk of death from lung cancer. The number needed to treat is 320 (for every 320 patients who undergo screening, 1 patient will have a benefit in mortality from early detection from the screening).    Undergoing this screening implies willingness to pursue further potentially invasive testing to discover potential cancer.    All questions were answered.    The patient was counseled regarding smoking cessation and its risk for lung cancer.      CCx: huang    HPI: Ms. Singh is a 70 year old female who presents for follow up of her PFTs.  She has emphysema and some shortness of breath with exertion.  Since we last saw her she had a mild cough with nasal drainage treated with prednisone.  On her last virtual visit she denied symptomology, but today she thinks she might be getting a little more short of breath. She doesn't have a daily cough.  She remains tobacco free.  She would like to get restarted on the lung cancer screening today.    ROS:  Review of Systems - History obtained from the patient  General ROS: negative  Psychological ROS: negative  ENT ROS: negative  Allergy and Immunology ROS: negative  Endocrine ROS: negative  Respiratory ROS: positive for - shortness of breath  negative for - hemoptysis, orthopnea, pleuritic pain or tachypnea  Cardiovascular ROS: no chest pain or palpitations  Gastrointestinal ROS: no abdominal pain, change in bowel habits, or black or bloody stools  Genito-Urinary ROS: no dysuria, trouble voiding, or hematuria  Musculoskeletal ROS: negative  Neurological ROS: no TIA or stroke symptoms  Dermatological ROS: negative      Current Meds:  Current Outpatient Medications   Medication Sig     amLODIPine (NORVASC) 5 MG tablet TAKE 1 TABLET(5 MG) BY MOUTH DAILY     atorvastatin (LIPITOR) 20 MG tablet TAKE 1 TABLET BY MOUTH EVERY NIGHT AT BEDTIME     estradiol-norethindrone (ACTIVELLA) 1-0.5 mg per tablet Take  0.5 tablets by mouth at bedtime.      lisinopriL (PRINIVIL,ZESTRIL) 20 MG tablet Take 1 tablet (20 mg total) by mouth daily.       Labs:  No results found for this or any previous visit (from the past 72 hour(s)).    I have personally reviewed all pertinent imaging studies and PFT results unless otherwise noted.    PFTs 05/12/21    FEV1/FVC is 0.63 and is reduced.  FEV1 is 66% predicted and is reduced.  FVC is 81% predicted and is normal.  There was no improvement in spirometry after a single inhaled dose of bronchodilator.  DLCO is 60% predicted and is reduced when it   is corrected for hemoglobin.  The flow volume loop is normal No.     Impression:  Full Pulmonary Function Test is abnormal. Spirometry is consistent with moderate obstructive ventilatory defect.  Spirometry is not consistent with reversibility.  Diffusion capacity when corrected for hemoglobin is moderately reduced.    Imaging studies:  Cta Chest Pe Run    Result Date: 3/5/2020  EXAM: CTA CHEST PE RUN LOCATION: [facility] DATE/TIME: 3/5/2020 11:58 AM INDICATION:  Pleuritic right lower chest pain, recent air travel, status post right lower, middle and left lower lobe wedge resections 2 months ago  for benign, necrotizing granulomatous infection. COMPARISON: 01/29/2020 and multiple older studies TECHNIQUE: CT angiogram chest during arterial phase injection IV contrast. 2D and 3D MIP reconstructions were performed by the CT technologist. Dose reduction techniques were used. CONTRAST: Iohexol (Omni) 100 mL FINDINGS: ANGIOGRAM CHEST: Focal opacification of pulmonary arteries and branches. No evidence of pulmonary emboli. Thoracic aorta is normal caliber.  LUNGS AND PLEURA: Surgical changes noted in the right middle and both lower lobes. Moderate emphysema. No new parenchymal disease. MEDIASTINUM/AXILLAE: No adenopathy. UPPER ABDOMEN: Unremarkable. MUSCULOSKELETAL: Fractures or suspicious lesions.     1.  No evidence of pulmonary emboli. No  "abnormalities noted to explain pain. 2.  Moderate emphysema. 3.  Stable postthoracotomy changes with wedge resection in both lower lobes and right middle lobe.        Physical Exam:  /60   Pulse 77   Ht 5' 7.75\" (1.721 m)   Wt 151 lb (68.5 kg)   SpO2 98%   BMI 23.13 kg/m    General - Well nourished  Ears/Mouth -  Deferred given mask use during pandemic  Neck - no cervical lymphadenopathy  Lungs - Clear to ausculation bilaterally   CVS - regular rhythm with no murmurs, rubs or gallups  Abdomen - soft, NT, ND, NABS  Ext - no cyanosis, clubbing or edema  Skin - no rash  Psychology - alert and oriented, answers appropriate        Electronically signed by:    Dayday Meyer MD PhD  [facility]  "

## 2021-06-19 NOTE — LETTER
Letter by Dayday Meyer MD at      Author: Dayday Meyer MD Service: -- Author Type: --    Filed:  Encounter Date: 9/23/2019 Status: Signed         Miley Tolliver MD  [address] [facility] [address]                                  September 23, 2019    Patient: Katey Singh   MR Number: [id number]   YOB: 1950   Date of Visit: 9/23/2019     Dear Dr. Unruly MD:    Thank you for referring Katey Singh to me for evaluation. Below are the relevant portions of my assessment and plan of care.    If you have questions, please do not hesitate to call me. I look forward to following Katey along with you.    Sincerely,        Dayday Meyer MD          CC  No Recipients  Dayday Meyer MD  9/23/2019  2:07 PM  Sign when Signing Visit  Assessment and Plan:Katey Singh is a 69 y.o. F with a past medical history significant for tobacco abuse who presents to clinic today for a positive lung cancer screening test.  She has 3 large, round pulmonary nodules in her chest that are concerning for cancerous growth.  They are bilateral and are less likely to represent a lung primary.  A PET scan is the next step in her workup to determine the extent of any other metastasis, or to validate which one of these lesions should be biopsied.  Any of these should be readily accessible via a CT guided FNA.  She has had substantial anxiety over the 3 week delay imposed by her insurance company for this PET scan.  She is not sleeping, and eating poorly because of this.  I believe treating this with anxiolytics for situational anxiety is warranted..   1. Anxiety from adjustment disorder - prescribed Xanax 0.25mg three times a day prn for anxiety and sleep.  Quantity 30.  Our two factor authentication step is not working since the Epic upgrade.  Provided a paper prescription if this will work.  2. Lung nodules - proceed with PET scan as above - will be in touch regarding results when  available.  3. RTC prn    CCx: nodules    HPI: Ms. Singh is a 68 yo female who presents to discuss the results of her lung cancer screening study.  She has no lung symptoms whatsoever, nor any personal history of cancer.  She has had negative evaluations for endometrial thickening and yearly mammograms.  She completed her colonoscopy 4 years ago.  She is actively quitting smoking, is down to 1-2 cigarettes per day on chantix.  The PET scan was ordered to workup her nodules but it has been scheduled out 3 weeks per insurance requirements.   She is sleeping very poorly, is not eating well and is constantly dreading the worse case scenario.      PMH:  Past Medical History:   Diagnosis Date   ? Hypertension        PSH:  Past Surgical History:   Procedure Laterality Date   ? ND LIGATE FALLOPIAN TUBE      Description: Tubal Ligation;  Proc Date: 01/01/1987;       SH:  Social History     Socioeconomic History   ? Marital status:      Spouse name: Not on file   ? Number of children: Not on file   ? Years of education: Not on file   ? Highest education level: Not on file   Occupational History   ? Not on file   Social Needs   ? Financial resource strain: Not on file   ? Food insecurity:     Worry: Not on file     Inability: Not on file   ? Transportation needs:     Medical: Not on file     Non-medical: Not on file   Tobacco Use   ? Smoking status: Current Every Day Smoker     Types: Cigarettes     Start date: 7/18/2018   ? Smokeless tobacco: Never Used   ? Tobacco comment: Just started back up, 7/18. Smoking about 1 cig/day   Substance and Sexual Activity   ? Alcohol use: Yes     Comment: rare   ? Drug use: No   ? Sexual activity: Yes     Partners: Male   Lifestyle   ? Physical activity:     Days per week: Not on file     Minutes per session: Not on file   ? Stress: Not on file   Relationships   ? Social connections:     Talks on phone: Not on file     Gets together: Not on file     Attends [employer] service: Not  on file     Active member of club or organization: Not on file     Attends meetings of clubs or organizations: Not on file     Relationship status: Not on file   ? Intimate partner violence:     Fear of current or ex partner: Not on file     Emotionally abused: Not on file     Physically abused: Not on file     Forced sexual activity: Not on file   Other Topics Concern   ? Not on file   Social History Narrative   ? Not on file       Family history:  Family History   Problem Relation Age of Onset   ? Hypertension Mother    ? Cancer Father 72        stomach   ? Hypertension Sister    ? Cancer Sister 76        Soft tissues sarcoma - 7 lb tumor - lived in [place] - fought for 10 years   ? Hypertension Maternal Aunt    ? Hypertension Maternal Aunt    ? Heart disease Maternal Aunt    ? Hypertension Maternal Aunt    ? Diabetes Brother    ? Hyperlipidemia Brother    ? Hypertension Brother    ? Heart disease Brother    ? Colon cancer Neg Hx    ? Breast cancer Neg Hx    ? Clotting disorder Neg Hx      The family history was reviewed and is not pertinent to the chief complaint or HPI.    ROS:  Review of Systems - History obtained from the patient  General ROS: negative  Psychological ROS: severe situational anxiety  ENT ROS: negative  Allergy and Immunology ROS: negative  Endocrine ROS: negative  Respiratory ROS:   negative for - cough, hemoptysis, orthopnea, pleuritic pain, shortness of breath, sputum changes, stridor, tachypnea or wheezing  Cardiovascular ROS: no chest pain or palpitations  Gastrointestinal ROS: no abdominal pain, change in bowel habits, or black or bloody stools  Genito-Urinary ROS: no dysuria, trouble voiding, or hematuria  Musculoskeletal ROS: negative  Neurological ROS: no TIA or stroke symptoms  Dermatological ROS: negative      Current Meds:  Current Outpatient Medications   Medication Sig   ? estradiol-norethindrone (ACTIVELLA) 1-0.5 mg per tablet Take 0.5 tablets by mouth daily.   ? traZODone  (DESYREL) 50 MG tablet Take 1/2 a tablet to 1 tablet each night as needed.   ? varenicline (CHANTIX STARTING MONTH BOX) 0.5 mg (11)- 1 mg (42) tablet 1 wk before you stop smoking take 0.5mg daily on days 1-3, 0.5mg 2 times each day on days 4-7, then 1mg 2 times daily.   ? varenicline (CHANTIX) 1 mg tablet Take 1 tab by mouth two times a day. Take after eating with a full glass of water. NOTE: Dispense as maintenance for refills only.   ? ALPRAZolam (XANAX) 0.25 MG tablet Take 1 tablet (0.25 mg total) by mouth 3 (three) times a day as needed for sleep or anxiety.       Labs:  No results found for this or any previous visit (from the past 72 hour(s)).    I have personally reviewed all imaging and PFT data available pertinent to this visit.    Imaging studies:  Ct Low Dose Lung Screening Chest    Result Date: 9/17/2019  EXAM: LOW DOSE LUNG CANCER SCREENING CT CHEST LOCATION: [facility] DATE/TIME: 9/17/2019 11:57 AM INDICATION: Lung cancer screening. High risk patient with greater than 30 pack year smoking history. COMPARISON: None. TECHNIQUE: Low-dose lung cancer screening noncontrast CT chest. Dose reduction techniques were used. FINDINGS: LUNGS AND PLEURA: 1.8 x 1.4 cm well-circumscribed pulmonary nodule in the right lower lobe posteriorly. 8 mm noncalcified nodule in the right middle lobe. 16 x 12 mm noncalcified pulmonary nodule left lower lobe anteriorly, abutting the major fissure. Mild centrilobular emphysema. MEDIASTINUM: Small mediastinal nodes are not enlarged by size criteria. CORONARY ARTERY CALCIFICATION: No visible coronary artery calcification. LIMITED UPPER ABDOMEN: 8 mm benign left renal angiomyolipoma. MUSCULOSKELETAL: Negative.     CONCLUSION: 1.  There are solid, well-circumscribed pulmonary nodules in the left lower lobe, right lower lobe, and right middle lobe, measuring up to 1.8 cm in diameter. RADIOLOGIST RECOMMENDATION: PET/CT. LungRADS CATEGORY: 4BS: Suspicious. SIGNIFICANT  "INCIDENTAL FINDINGS: Positive. Lung nodules may represent metastatic disease from a non lung primary.          Physical Exam:  /82   Pulse 77   Resp 12   Ht 5' 7\" (1.702 m)   Wt 149 lb (67.6 kg)   SpO2 97%   Breastfeeding? No   BMI 23.34 kg/m     General - Well nourished  Ears/Mouth - OP pink moist, no thrush  Neck - no cervical lymphadenopathy  Lungs - Clear to ausculation bilaterally   CVS - regular rhythm with no murmurs, rubs or gallups  Abdomen - soft, NT, ND, NABS  Ext - no cyanosis, clubbing or edema  Skin - no rash  Psychology - alert and oriented, answers appropriate        Electronically signed by:    Dayday Meyer MD PhD  [facility]       "

## 2021-06-19 NOTE — LETTER
Letter by Christian Dumas MD at      Author: Christian Dumas MD Service: -- Author Type: --    Filed:  Encounter Date: 11/7/2019 Status: Signed         Miley Tolliver MD  [address] [facility] [address]                                  November 7, 2019    Patient: Katey Singh   MR Number: [id number]   YOB: 1950   Date of Visit: 11/7/2019     Dear Dr. Unruly MD:    Thank you for referring Katey Singh to me for evaluation. Below are the relevant portions of my assessment and plan of care.    If you have questions, please do not hesitate to call me. I look forward to following Ktaey along with you.    Sincerely,        Christian Dumas MD          CC  MD Piter Chavez Rafael S, MD  11/7/2019  2:08 PM  Sign when Signing Visit  THORACIC SURGERY OFFICE NEW PATIENT VISIT      Dear Dr. Tolliver,    I saw Ms. Singh at Dr. Meyer' request in consultation for the evaluation and treatment of a 3 lung nodules, one of which is a biopsy-proven adenocarcinoma of the lung..     HPI  Mrs. Katey Singh is a 69 y.o. year-old female with incidentally found lung nodules. 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    Tests     CT chest (9/17/2019): 3 lung nodules (RLL 18 mm, RML 8 mm, LLL 16 mm). The RLL nodule is exactly between S9 and S10.            PET-CT (10/10/2019): RLL SUV 2.2, RML and LLL SUV<2  CT biopsy (10/28/2019): RLL = adenocarcinoma, lepidic predominant  PFT (11/6/2019): FEV1  82%, DLCO 96%    PMH    Past Medical History:   Diagnosis Date   ? Hypertension       Past Surgical History:   Procedure Laterality Date   ? CT BIOPSY LUNG  10/28/2019   ? MO LIGATE FALLOPIAN TUBE      Description: Tubal Ligation;  Proc Date: 01/01/1987;         ETOH social  TOB 10 pack years, quit 3 weeks ago    Physical examination  BMI 23  Appears younger than her stated age    From a personal perspective, she is here with her , Oh. They are  physically active.      IMPRESSION   1. Lung nodule        69 y.o. year-old female with a cT1bN0 adenocarcinoma of the RIGHT lower lobe  2 additional lung nodules    PLAN  I spent a total of 45 minutes with Ms. Singh and her , Oh, more than 50% of which were spent in counseling, coordination of care, and face-to-face time. I reviewed the plan as follows:    1) Procedure planned: RIGHT VATS lower lobe segmentectomy (S9 and S10) and RIGHT middle lobe wedge resection, with possible thoracotomy.  I reviewed the indications, risks, and benefits of the procedure with Mrs. Katey Singh. We discussed the intraoperative risks of bleeding and injury to vital organs, potential postoperative complications including, but not limited to, major respiratory events, arrhythmia, bleeding, infection, reoperation, and death. I explained the anticipated hospital course (+/- 2-4 days) and postoperative recovery including pain control, chest drain management, and variable degrees of dyspnea (or need for supplemental oxygen) and fatigue that tend to get better with time.      2) LEFT lower lobe wedge vs segmentectomy at a later stage depending on final pathology    3) Enoxaparin 40 mg SQ in preop holding    They had all their questions answered and were in agreement with the plan.  I appreciate the opportunity to participate in the care of your patient and will keep you updated.    Sincerely,

## 2021-06-19 NOTE — LETTER
Letter by Dayday Meyer MD at      Author: Dayday Meyer MD Service: -- Author Type: --    Filed:  Encounter Date: 11/25/2019 Status: Signed         Miley Tolliver MD  [address] [facility] [address]                                  November 25, 2019    Patient: Katey Singh   MR Number: [id number]   YOB: 1950   Date of Visit: 11/25/2019     Dear Dr. Unruly MD:    Thank you for referring Katey Singh to me for evaluation. Below are the relevant portions of my assessment and plan of care.    If you have questions, please do not hesitate to call me. I look forward to following Katey along with you.    Sincerely,        Dayday Meyer MD          CC  No Recipients  Dayday Meyer MD  11/25/2019  9:40 AM  Sign when Signing Visit  Assessment and Plan:Katey Singh is a 69 y.o. F with a past medical history significant for adenocarcinoma of the Right lower lung and 2 other nodules who presents to clinic today for follow up.  She returns for follow up of these nodules and to discuss the plan with Dr. Dumas.  He is planning to remove the cancer and potentially the other nodule on the same side.  She is concerned with the plan to then do a surgery on the left side 6 weeks later without a biopsy first.  We discussed the option of a CT guided biopsy if she is concerned about a second surgery, as well as how the results of the first surgery, recovery and pathology, could help us decide.    1) Lung cancer - planning for wedge versus lobectomy with Dr. Dumas on Dec 12.  2) Lung nodules - Second nodule on right side not biopsied, this may be removed by wedge at the same surgery.  Left sided nodule consider surgical removal versus biopsy after recovery from first.    3) RTC PRN.           CCx: lung cancer     HPI: Ms. Singh is a 69 year old female who returns to discuss the plan for her lung cancer.  We found three nodules on a screening test, a PET scan was  relatively indeterminate, but a biopsy of the largest, easiest to biopsy nodule showed an adenocarcinoma.  She has seen Dr. Dumas who is planning surgery on Aug 12 as we do not have evidence of metastatic disease.  She would like to know if this plan makes sense, and wants to know if she has the option to biopsy before surgery on the left side as she is worried about too many surgeries.  She feels fine and has no new pulmonary symptoms.  She has struggled some with anxiety.  She is also having higher blood pressure lately.    ROS:  Review of Systems - History obtained from the patient  General ROS: negative  Psychological ROS: anxiety  ENT ROS: negative  Allergy and Immunology ROS: negative  Endocrine ROS: negative  Respiratory ROS:   negative for - cough, hemoptysis, orthopnea, pleuritic pain, shortness of breath, sputum changes, stridor, tachypnea or wheezing  Cardiovascular ROS: no chest pain or palpitations  Gastrointestinal ROS: no abdominal pain, change in bowel habits, or black or bloody stools  Genito-Urinary ROS: no dysuria, trouble voiding, or hematuria  Musculoskeletal ROS: negative  Neurological ROS: no TIA or stroke symptoms  Dermatological ROS: negative      Current Meds:  Current Outpatient Medications   Medication Sig   ? atorvastatin (LIPITOR) 20 MG tablet Take 1 tablet (20 mg total) by mouth at bedtime. (Patient taking differently: Take 10 mg by mouth at bedtime.       )   ? estradiol-norethindrone (ACTIVELLA) 1-0.5 mg per tablet Take 0.5 tablets by mouth daily.       Labs:  No results found for this or any previous visit (from the past 72 hour(s)).    I have personally reviewed all pertinent imaging studies and PFT results unless otherwise noted.    Imaging studies:  No results found.      Physical Exam:  BP (!) 154/102   Pulse 66   Resp 17   Wt 152 lb (68.9 kg)   SpO2 97% Comment: RA  BMI 23.46 kg/m     General - Well nourished  Ears/Mouth -  OP pink moist, no thrush  Neck - no cervical  lymphadenopathy  Lungs - Clear to ausculation bilaterally   CVS - regular rhythm with no murmurs, rubs or gallups  Abdomen - soft, NT, ND, NABS  Ext - no cyanosis, clubbing or edema  Skin - no rash  Psychology - alert and oriented, answers appropriate        Electronically signed by:    Dayday Meyer MD PhD  [facility]

## 2021-06-19 NOTE — PROGRESS NOTES
Assessment and Plan:   Katey was seen today for annual wellness visit.    Diagnoses and all orders for this visit:    Routine general medical examination at a health care facility  Discussed Shingrix vaccine.  She will check with her insurance.  Hx of Shingles involving right eye lid 10 years ago.    Visit for screening mammogram  -     Mammo Screening Bilateral; Future    Menopausal and perimenopausal disorder   -     DXA Bone Density Scan; Future    Sebaceous cyst  -     Ambulatory referral to Dermatology  On upper back for years, now enlarging and getting uncomfortable.      Mixed hyperlipidemia  -     Lipid Cascade RANDOM  -     Comprehensive Metabolic Panel    Other orders  -     Pneumococcal polysaccharide vaccine 23-valent 3 yo or older, subq/IM    Primary osteoarthritis both hands:  Due to overuse.  Does a lot of painting and remodeling work.  Pain in Right PIP and base of thumb joints.  She will try glucosamine 1500 mg daily, turmeric, or sour cherry juice.  If no improvement, would recommend a hand ortho referral.      The patient's current medical problems were reviewed.    I have had an Advance Directives discussion with the patient.  Honoring choices handout given today.  The following health maintenance schedule was reviewed with the patient and provided in printed form in the after visit summary:   Health Maintenance   Topic Date Due     ZOSTER VACCINE  06/24/2008     PNEUMOCOCCAL POLYSACCHARIDE VACCINE AGE 65 AND OVER  06/24/2013     FALL RISK ASSESSMENT  06/24/2013     DXA SCAN  09/04/2015     INFLUENZA VACCINE RULE BASED (1) 08/01/2018     MAMMOGRAM  08/21/2018     TD 18+ HE  02/16/2020     ADVANCE DIRECTIVES DISCUSSED WITH PATIENT  04/13/2021     COLONOSCOPY  11/24/2025     PNEUMOCOCCAL CONJUGATE VACCINE FOR ADULTS (PCV13 OR PREVNAR)  Completed        Subjective:   Chief Complaint: Katey Singh is an 68 y.o. female here for an Annual Wellness visit.   HPI:  Patient would like to  address the following concerns today:     Brother had a cerebellar stroke 4 weeks ago.  Has bad DM, PVD, esophageal bleed, CHF.  He is now in TCU.  They want to move him to another facility.  She is paying privately for this in the interim.  He is now walking.  He is cut off from PT.  He is 85.  She is the only person to care for him.  He has no children and their other sister passed away 3 years ago.    Has seborrheic keratosis on top of scalp 4mm.    Arthritis:  Right hand.  Paints a lot and doing a lot of remodelling.  Pain at base of thumbs and right PIP.  Discussed glucosamine, turmeric, sour cherry juice.    Sebaceous cyst on back: More painful, used to go down, now less frequently.  Uncomfortable in chairs.  Would like to get it removed.      Review of Systems:   Please see above.  The rest of the review of systems are negative for all systems.    Patient Care Team:  Miley Tolliver MD as PCP - General     Patient Active Problem List   Diagnosis     Menopause     Benign Essential Hypertension     Joint Pain, Localized In The Knee     Tendonitis     Muscle Spasm     Mixed hyperlipidemia     Past Medical History:   Diagnosis Date     Hypertension       Past Surgical History:   Procedure Laterality Date     ND LIGATE FALLOPIAN TUBE      Description: Tubal Ligation;  Proc Date: 01/01/1987;      Family History   Problem Relation Age of Onset     Hypertension Mother      Cancer Father 72     stomach     Hypertension Sister      Cancer Sister 76     Soft tissues sarcoma - 7 lb tumor - lived in [place] - fought for 10 years     Hypertension Maternal Aunt      Hypertension Maternal Aunt      Heart disease Maternal Aunt      Hypertension Maternal Aunt      Diabetes Brother      Hyperlipidemia Brother      Hypertension Brother      Heart disease Brother      Colon cancer Neg Hx      Breast cancer Neg Hx      Clotting disorder Neg Hx       Social History     Social History     Marital status:      Spouse name: N/A      "Number of children: N/A     Years of education: N/A     Occupational History     Not on file.     Social History Main Topics     Smoking status: Former Smoker     Smokeless tobacco: Never Used     Alcohol use Yes      Comment: rare     Drug use: No     Sexual activity: Yes     Partners: Male     Other Topics Concern     Not on file     Social History Narrative      Current Outpatient Prescriptions   Medication Sig Dispense Refill     estradiol-norethindrone (ACTIVELLA) 1-0.5 mg per tablet Take 0.5 tablets by mouth daily.       aspirin 81 MG EC tablet Take 81 mg by mouth daily.       [START ON 8/11/2018] pneumococcal vaccine (PNEUMOVAX-23) 25 mcg/0.5 mL injection Inject 0.5 mL into the shoulder, thigh, or buttocks Once prior to discharge for 1 dose. 0.5 mL 0     No current facility-administered medications for this visit.       Objective:   Vital Signs:   Visit Vitals     /80     Pulse 62     Ht 5' 7.25\" (1.708 m)     Wt 139 lb (63 kg)     SpO2 98%     BMI 21.61 kg/m2        VisionScreening:  No exam data present     PHYSICAL EXAM    General Appearance:    Alert, cooperative, no distress, appears stated age   Head:    Normocephalic, without obvious abnormality, atraumatic   Eyes:    PERRL, conjunctiva/corneas clear, EOM's intact, fundi     benign, both eyes   Ears:    Normal TM's and external ear canals, both ears   Nose:   Nares normal, septum midline, mucosa normal, no drainage    or sinus tenderness   Throat:   Lips, mucosa, and tongue normal; teeth and gums normal   Neck:   Supple, symmetrical, trachea midline, no adenopathy;     thyroid:  no enlargement/tenderness/nodules; no carotid    bruit or JVD   Back:     Symmetric, no curvature, ROM normal, no CVA tenderness   Lungs:     Clear to auscultation bilaterally, respirations unlabored   Chest Wall:    No tenderness or deformity    Heart:    Regular rate and rhythm, S1 and S2 normal, no murmur, rub   or gallop   Breast Exam:    At [facility] "   Abdomen:     Soft, non-tender, bowel sounds active all four quadrants,     no masses, no organomegaly   Genitalia:    At [facility]   Rectal:    Not performed.   Extremities:   Extremities normal, atraumatic, no cyanosis or edema   Pulses:   2+ and symmetric all extremities   Skin:   4mm seborrheic keratosis on scalp.  Large 1.5 cm sebaceous cyst on upper back.     Lymph nodes:   Cervical, supraclavicular, and axillary nodes normal   Neurologic:   CNII-XII intact, normal strength, sensation and reflexes     throughout         Assessment Results 8/10/2018   Activities of Daily Living No help needed   Instrumental Activities of Daily Living No help needed   Mini Cog Total Score 4   Some recent data might be hidden     A Mini-Cog score of 0-2 suggests the possibility of dementia, score of 3-5 suggests no dementia    Identified Health Risks:     Information regarding advance directives (living wall), including where she can download the appropriate form, was provided to the patient via the AVS.

## 2021-06-19 NOTE — LETTER
Letter by Leah Garcia MD at      Author: Leah Garcia MD Service: -- Author Type: --    Filed:  Encounter Date: 11/27/2019 Status: Signed         Katey Singh  [address] [place] [address]             November 27, 2019         Dear Ms. Singh,    Below are the results from your recent visit:    Resulted Orders   Basic Metabolic Panel   Result Value Ref Range    Sodium 140 136 - 145 mmol/L    Potassium 4.0 3.5 - 5.0 mmol/L    Chloride 106 98 - 107 mmol/L    CO2 26 22 - 31 mmol/L    Anion Gap, Calculation 8 5 - 18 mmol/L    Glucose 92 70 - 125 mg/dL    Calcium 9.3 8.5 - 10.5 mg/dL    BUN 11 8 - 28 mg/dL    Creatinine 0.81 0.60 - 1.10 mg/dL    GFR MDRD Af Amer >60 >60 mL/min/1.73m2    GFR MDRD Non Af Amer >60 >60 mL/min/1.73m2    Narrative    Fasting Glucose reference range is 70-99 mg/dL per  American Diabetes Association (ADA) guidelines.   HM2(CBC w/o Differential)   Result Value Ref Range    WBC 9.4 4.0 - 11.0 thou/uL    RBC 4.72 3.80 - 5.40 mill/uL    Hemoglobin 15.0 12.0 - 16.0 g/dL    Hematocrit 44.5 35.0 - 47.0 %    MCV 94 80 - 100 fL    MCH 31.8 27.0 - 34.0 pg    MCHC 33.8 32.0 - 36.0 g/dL    RDW 11.9 11.0 - 14.5 %    Platelets 176 140 - 440 thou/uL    MPV 8.3 7.0 - 10.0 fL       Your kidney,liver,electrolytes and blood sugar test were all within normal limit.  Dr Garcia      Please call with questions or contact us using [employer].    Sincerely,        Electronically signed by Leah Garcia MD

## 2021-06-19 NOTE — LETTER
Letter by Miley Tolliver MD at      Author: Miley Tolliver MD Service: -- Author Type: --    Filed:  Encounter Date: 9/13/2019 Status: (Other)         Katey Singh  [address]  [place] [address]     September 13, 2019         Dear Ms. Singh,    Below are the results from your recent visit:    Resulted Orders   Lipid Cascade   Result Value Ref Range    Cholesterol 280 (H) <=199 mg/dL    Triglycerides 225 (H) <=149 mg/dL    HDL Cholesterol 61 >=50 mg/dL    LDL Calculated 174 (H) <=129 mg/dL    Patient Fasting > 8hrs? Yes    Glucose   Result Value Ref Range    Glucose 91 70 - 99 mg/dL    Patient Fasting > 8hrs? Yes     Narrative    Fasting Glucose reference range is 70-99 mg/dL per  American Diabetes Association (ADA) guidelines.     1) You blood sugar is normal.    2) Your cholesterol is quite high.    Your risk of heart attack or stroke is 16.1% over 10 years.  At this time I would recommend starting on a low dose statin medication such as simvastatin.  Please call the clinic if you are okay with this, as follow up labs would need to occur in 6-8 weeks.  I would also recommend quitting those last two cigarettes.  With both of these changes, we can cut your stroke and heart attack risk in half to 8%.    Please call with questions or contact us using [employer].    Sincerely,        Electronically signed by Miley Tolliver MD

## 2021-06-20 ENCOUNTER — HEALTH MAINTENANCE LETTER (OUTPATIENT)
Age: 71
End: 2021-06-20

## 2021-06-20 NOTE — LETTER
Letter by Dayday Meyer MD at      Author: Dayday Meyer MD Service: -- Author Type: --    Filed:  Encounter Date: 12/23/2019 Status: Signed         Miley Tolliver MD  [address] [facility] [address]                                  December 23, 2019    Patient: Katey Singh   MR Number: [id number]   YOB: 1950   Date of Visit: 12/23/2019     Dear Dr. Unruly MD:    Thank you for referring Katey Singh to me for evaluation. Below are the relevant portions of my assessment and plan of care.    If you have questions, please do not hesitate to call me. I look forward to following Katey along with you.    Sincerely,        Dayday Meyer MD          CC  MD Mitch Headley Bryan Jeffrey, MD  12/23/2019  5:12 PM  Sign when Signing Visit  Assessment and Plan:Katey Singh is a 69 y.o. F with a past medical history significant for tobacco abuse, hypertension who presents to clinic today for follow up of lung surgery for presumed adenocarcinoma.  Upon excision of all three lung nodules the pathology was revised as her actual diagnosis is blastomycosis.  With enough inflammation, architectural distortion created a false positive result of adenocarcinoma on the initial needle biopsy.   As she no longer has the nodules, and was asymptomatic to begin, I'm not certain she requires any treatment.  I think she is recovering as expected from her surgery, and at this point I would like to reimage her lungs in 6 weeks to ensure there is no new nodules to suggest the blasto is still active.    1) Blastomycosis - s/p bilateral excision of all three nodules.  Biopsy proven with stain showing large yeast.  Of note she has wolfhounds that have gotten pneumonia before, and she has lost prior wolfhounds from pneumonia.  They have a cabin on the [facility].  2) Healing from lung surgery - struggling a little, but has turned the corner, working on IS, taking her pain  medication as instructed, using her exercise machine.   3) Tobacco abuse - probably the best outcome of this course was that she has successfully quit smoking.  4) RTC in 2 months after CT is done.  If she is asymptomatic and CT is benign, this can be cancelled.      CCx: blastomycosis    HPI: Ms. Singh is a 69 year old female with a recent history of lung nodules that were PET positive and CT guided biopsy suggestive of adenocarcinoma.  She presents today as a hospital follow up and to discuss the results of her biopsy, which she was not completely aware of after her hospital stay.  She states the recovery has been hard.  She doesn't like taking the pain meds, but her primary doctor has informed her how important it is so she can take full breaths.  She is turning the corner and no longer has pain on deep breaths.  She does get some pains on turning.  She has quit smoking.    ROS:  Review of Systems - History obtained from the patient  General ROS: negative  Psychological ROS: negative  ENT ROS: negative  Allergy and Immunology ROS: negative  Endocrine ROS: negative  Respiratory ROS: positive for - pleuritic pain  negative for - cough, hemoptysis, orthopnea, tachypnea or wheezing  Cardiovascular ROS: no chest pain or palpitations  Gastrointestinal ROS: no abdominal pain, change in bowel habits, or black or bloody stools  Genito-Urinary ROS: no dysuria, trouble voiding, or hematuria  Musculoskeletal ROS: negative  Neurological ROS: no TIA or stroke symptoms  Dermatological ROS: negative      Current Meds:  Current Outpatient Medications   Medication Sig   ? acetaminophen (TYLENOL) 500 MG tablet Take 1-2 tablets (500-1,000 mg total) by mouth every 4 (four) hours as needed.   ? atorvastatin (LIPITOR) 20 MG tablet Take 1 tablet (20 mg total) by mouth at bedtime.   ? diphenhydrAMINE (BENADRYL) 25 mg tablet Take 1 tablet (25 mg total) by mouth every 6 (six) hours as needed for itching or allergies.   ? docusate sodium  (COLACE) 100 MG capsule Take 1 capsule (100 mg total) by mouth 2 (two) times a day as needed for constipation.   ? estradiol-norethindrone (ACTIVELLA) 1-0.5 mg per tablet Take 0.5 tablets by mouth at bedtime.    ? lisinopril (PRINIVIL,ZESTRIL) 10 MG tablet Take 1 tablet (10 mg total) by mouth daily.   ? metoprolol tartrate (LOPRESSOR) 50 MG tablet Take 1 tablet (50 mg total) by mouth 2 (two) times a day.   ? oxyCODONE (ROXICODONE) 5 MG immediate release tablet Take 0.5 tablets (2.5 mg total) by mouth every 4 (four) hours as needed.       Labs:  No results found for this or any previous visit (from the past 72 hour(s)).    I have personally reviewed all pertinent imaging studies and PFT results unless otherwise noted.    Imaging studies:  Xr Chest 1 View Portable    Result Date: 12/10/2019  EXAM: XR CHEST 1 VIEW PORTABLE LOCATION: [facility] DATE/TIME: 12/10/2019 5:48 PM INDICATION: S/P VATs possible thoracotomy. COMPARISON: 10/28/2019.     Right chest tube tip at the apex with pneumothorax measuring 1.9 cm to the apex. Postoperative changes on the right with lung sutures with adjacent airspace disease and atelectasis. Subcutaneous emphysema. Left chest tube tip overlies left lower lobe. Adjacent pulmonary sutures and high density. Possible tiny left apical pneumothorax. Suboptimal ventilatory effort. Follow-up recommended. NOTE: ABNORMAL REPORT THE DICTATION ABOVE DESCRIBES AN ABNORMALITY FOR WHICH FOLLOW-UP IS NEEDED.     Xr Chest 2 Views    Result Date: 12/11/2019  EXAM: XR CHEST 2 VIEWS LOCATION: [facility] DATE/TIME: 12/11/2019 8:55 AM INDICATION: RIGHT VATS lower lobe segmentectomy (S9 and S10) and RIGHT middle lobe wedge resection, with possible thoracotomy COMPARISON: 12/10/2019     There is a new small left sided pneumothorax measuring approximately 1 cm at the apex. Inferiorly positioned left sided pleural drain remains in place. Minimal interstitial infiltrate at left lung base. No  "significant change in the modest residual right-sided hydropneumothorax measuring 2.8 cm at the apex but also measuring 5 cm at right costophrenic angle. Right-sided chest tube remains in good position. Opacity present involving right middle and lower lobes may relate to residual atelectatic lung or possible parenchymal infiltrate/pneumonia. Right-sided subcutaneous emphysema has increased. Heart size remains normal. NOTE: ABNORMAL REPORT THE DICTATION ABOVE DESCRIBES AN ABNORMALITY FOR WHICH FOLLOW-UP IS NEEDED.         Physical Exam:  /82   Pulse 85   Resp 12   Ht 5' 8\" (1.727 m)   Wt 142 lb (64.4 kg)   SpO2 94%   Breastfeeding No   BMI 21.59 kg/m     General - Well nourished  Ears/Mouth -  OP pink moist, no thrush  Neck - no cervical lymphadenopathy  Lungs - Clear to ausculation bilaterally  CVS - regular rhythm with no murmurs, rubs or gallups  Abdomen - soft, NT, ND, NABS  Ext - no cyanosis, clubbing or edema  Skin - no rash  Psychology - alert and oriented, answers appropriate        Electronically signed by:    Dayday Meyer MD PhD  [facility] Pulmonary and Critical Care Medicine       "

## 2021-06-20 NOTE — LETTER
"Letter by Dayday Meyer MD at      Author: Dayday Meyer MD Service: -- Author Type: --    Filed:  Encounter Date: 2/10/2020 Status: (Other)         Miley Tolliver MD  [address] [facility] [address]                                  February 10, 2020    Patient: Katey Singh   MR Number: [id number]   YOB: 1950   Date of Visit: 2/10/2020     Dear Dr. Unruly MD:    Thank you for referring Katey Singh to me for evaluation. Below are the relevant portions of my assessment and plan of care.    If you have questions, please do not hesitate to call me. I look forward to following Katey along with you.    Sincerely,        Dayday Meyer MD          CC  No Recipients  Dayday Meyer MD  2/10/2020 12:24 PM  Sign when Signing Visit  Assessment and Plan:Katey Singh is a 69 y.o. F with a past medical history significant for blastomycosis who presents to clinic today for follow up after multiple wedge resections for presumed cancer.  She had a biopsy positive for cancer, but after resection, it was shown to be blastomycosis, that is now s/p resection.  She has had a follow up CT to show that no new nodules have grown.  She is battling a mild \"exacerbation\" after a cold that is likely lingering after having diminished lung capacity from her surgery.    1) Cough - likely recovering from a viral bronchitis with diminished lung capacity,  Will try 9 day course of prednisone to improve the recovery time and restore some energy  2) Blastomycosis - appears resolved through surgery.    3) s/p multiple lung resection  - will recheck PFTs after 6 months from now to see if there is a diminished functional capacity.  She did not technically have COPD prior to surgery given restored airflows, however she is not immune from developing COPD in time despite stopping smoking.  4) RTC in 6 months           CCx: blastomycosis    HPI: Ms. Singh returns for follow up.  She has a " history of blastomycosis that was initially thought to be an adenocarcinoma given a biopsy result.  She is s/p bilateral resections of nodules, which were all seen to be blastomycosis.  She initially struggled with recovery after an 8 day hospitalization but then starting improving at home until she got a cold last week.  She feels it is still stuck in her chest and has set her back on recovery.  She denies fever or chills, she just has a deep cough and cant get enough air behind her cough to have a satisfying cough.  She doesn't have chest pains anymore.  She does have a slight weeping excoriation on her right lower abdomen from a drain that still hasn't closed up.  She isn't worried about it.    ROS:  Review of Systems - History obtained from the patient  General ROS: negative  Psychological ROS: negative  ENT ROS: negative  Allergy and Immunology ROS: negative  Endocrine ROS: negative  Respiratory ROS: positive for - cough and shortness of breath  negative for - hemoptysis, orthopnea or pleuritic pain  Cardiovascular ROS: no chest pain or palpitations  Gastrointestinal ROS: no abdominal pain, change in bowel habits, or black or bloody stools  Genito-Urinary ROS: no dysuria, trouble voiding, or hematuria  Musculoskeletal ROS: negative  Neurological ROS: no TIA or stroke symptoms  Dermatological ROS: negative      Current Meds:  Current Outpatient Medications   Medication Sig   ? atorvastatin (LIPITOR) 20 MG tablet TAKE 1 TABLET BY MOUTH EVERYDAY AT BEDTIME   ? estradiol-norethindrone (ACTIVELLA) 1-0.5 mg per tablet Take 0.5 tablets by mouth at bedtime.    ? metoprolol tartrate (LOPRESSOR) 25 MG tablet Take 1 tablet (25 mg total) by mouth 2 (two) times a day for 7 days.   ? predniSONE (DELTASONE) 10 mg tablet Take 30 mg by mouth daily for 3 days, THEN 20 mg daily for 3 days, THEN 10 mg daily for 3 days.       Labs:  No results found for this or any previous visit (from the past 72 hour(s)).    I have personally  reviewed all pertinent imaging studies and PFT results unless otherwise noted.    Imaging studies:  Ct Chest Without Contrast    Result Date: 1/29/2020  EXAM: CT CHEST WO CONTRAST LOCATION: [facility] DATE/TIME: 1/29/2020 10:09 AM INDICATION: Pneumonia COMPARISON: CT of the chest 09/17/2019; PET/CT 10/10/2019; chest radiographs 1/16/2020 and 12/16/2019 TECHNIQUE: CT chest without IV contrast. Multiplanar reformats were obtained. Dose reduction techniques were used. CONTRAST: None. FINDINGS: LUNGS AND PLEURA: Surgical resection staple lines are present in the basilar right lung and single staple line in the anterior left lower lobe. There is associated bands of cicatricial atelectasis. Mild residual right basilar pleural fluid/thickening. Minimal left pleural  effusion layering into the posterior sulcus. Background of upper zone predominant centrilobular emphysema. There are no airspace opacities. No global or regional airway wall thickening. MEDIASTINUM/AXILLAE: Cardiac chambers are normal in size. No pericardial effusion. Normal caliber thoracic aorta. Fatty replaced thymus in the anterior mediastinum. No discrete enlarged mediastinal or hilar lymph nodes. UPPER ABDOMEN: No significant finding. MUSCULOSKELETAL: Diffuse but small thoracic spine degenerative osteophytes. No fractures or aggressive bone lesions.     1.  Multiple staple lines in the basilar right lung from wedge resections and single staple line in the anterior left lower lobe. Related cicatricial atelectasis. 2.  Minimal bilateral pleural effusions. 3.  No findings to suggest an acute airspace infectious or inflammatory process 4.  Emphysema.         Physical Exam:  /78   Pulse 82   Resp 18   Wt 142 lb (64.4 kg)   SpO2 98% Comment: RA  BMI 21.59 kg/m     General - Well nourished  Ears/Mouth -  OP pink moist, no thrush  Neck - no cervical lymphadenopathy  Lungs - Clear to ausculation bilaterally   CVS - regular rhythm with no  murmurs, rubs or gallups  Abdomen - soft, NT, ND, NABS  Ext - no cyanosis, clubbing or edema  Skin - no rash  Psychology - alert and oriented, answers appropriate        Electronically signed by:    Dayday Meyer MD PhD  [facility] Pulmonary and Critical Care Medicine

## 2021-06-20 NOTE — LETTER
Letter by Christian Dumas MD at      Author: Christian Dumas MD Service: -- Author Type: --    Filed:  Encounter Date: 1/16/2020 Status: Signed         Miley Tolliver MD  [address] [facility] [address]                                  January 16, 2020    Patient: Katey Singh   MR Number: [id number]   YOB: 1950   Date of Visit: 1/16/2020     Dear Dr. Unruly MD:    Thank you for referring Katey Singh to me for evaluation. Below are the relevant portions of my assessment and plan of care.    If you have questions, please do not hesitate to call me. I look forward to following Katey along with you.    Sincerely,        Christian Dumas MD            MD Piter Chavez Rafael S, MD  1/16/2020  2:37 PM  Sign when Signing Visit  THORACIC SURGERY ESTABLISHED PATIENT OFFICE VISIT    Dear Dr. Tolliver,    I saw Ms. Singh in follow-up today. 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 NO evidence of malignancy.    COMPLICATIONS  None    INTERVAL STUDIES  CXR (today):       ETOH negative  TOB negative  BMI 22    SUBJECTIVE   Mrs. Singh is slowly recovering, surgery took a lot out of her, but she feels she is turning the corner. Two of her incisions are slightly weepy, one from the chest tube site and the other probably from a deep stitch.    IMPRESSION   1. Pulmonary nodules     2. Pulmonary granuloma (H)       69 year-old female 5 weeks S/P RIGHT lower lobe segmentectomy and bilateral wedge resections for pulmonary granulomatosis  NO EVIDENCE OF  MALIGNANCY     PLAN  I reviewed the plan as follows:    1) Long-term follow-up with Dr. Meyer  2) Follow-up with me in 1 month if she continues to have issues with her incisions.        They had all their questions answered and were in agreement with the plan.  I appreciate the opportunity to participate in the care of your patient and will keep you updated.  Sincerely,

## 2021-06-20 NOTE — LETTER
Letter by Shayna Madison MD at      Author: Shayna Madison MD Service: -- Author Type: --    Filed:  Encounter Date: 2/20/2020 Status: (Other)         Miley Tolliver MD  [address] [facility] [address]                                  February 20, 2020    Patient: Katey Singh   MR Number: [id number]   YOB: 1950   Date of Visit: 2/20/2020     Dear Dr. Unruly MD:    Thank you for referring Katey Singh to me for evaluation. Below are the relevant portions of my assessment and plan of care.    If you have questions, please do not hesitate to call me. I look forward to following Katey along with you.    Sincerely,        Shayna Madison MD          CC  MD Gricelda Chavez Nicole Lynn, MD  2/20/2020  1:07 PM  Sign when Signing Visit  Pulmonary Clinic Follow-up Visit    Assessment & Plan:  69 y F with prior lung nodules, s/p lung resection in Dec for what turned out to be blasto, who presents for a cough and URI symptoms for the last 2 weeks. No fevers, with clear CXR, overall consistent with viral process. Re-assured her of this today. No current indication for antibiotics, recommended supportive care, rest, pushing fluids. No baseline chronic lung disease based on PFTs.     PLAN:    5 day course of guaifenesin w codeine prescribed for nighttime cough symptoms    Prn albuterol inhaler for intermittent chest tightness, no wheezing today    She will call clinic and let us know if she does not continue to improve.      Follow-up with Dr. Meyer as scheduled.      Shayna Madison MD  Pulmonary and Critical Care Medicine  [facility]  Office: [phone]  Pager: [phone]    ----------------------------    CCx: Cough    HPI:   69 y F patient of Dr. Meyer, s/p lung resection in Dec for what turned out to be blasto. She follows up today due to persistent URI symptoms with cough. She saw Dr. Meyer for the same ~1 week ago, and took a course of prednisone  "which helped the chest tightness, she is not coughing up as much phleg, but cough is keeping her up at night. She took  cough medication w codeine which helped.    She had a CXR prior to the visit, which was stable - unchanged from prior, no infiltrates. She denies fevers. Feels like she has a bronchitis that is running its course. She is leaving for [place] soon and wanted to be evaluated.    --------------------  69 y.o. F with a past medical history significant for blastomycosis who presents to clinic today for follow up after multiple wedge resections for presumed cancer.  She had a biopsy positive for cancer, but after resection, it was shown to be blastomycosis, that is now s/p resection.  She has had a follow up CT to show that no new nodules have grown.  She is battling a mild \"exacerbation\" after a cold that is likely lingering after having diminished lung capacity from her surgery.    ROS:  A 12-system review was obtained and was negative with the exception of the symptoms endorsed in the history of present illness.    PMH:  Past Medical History:   Diagnosis Date   ? Hypertension      Current Meds:  Current Outpatient Medications   Medication Sig Dispense Refill   ? atorvastatin (LIPITOR) 20 MG tablet TAKE 1 TABLET BY MOUTH EVERYDAY AT BEDTIME 90 tablet 3   ? estradiol-norethindrone (ACTIVELLA) 1-0.5 mg per tablet Take 0.5 tablets by mouth at bedtime.      ? traZODone (DESYREL) 50 MG tablet Take 1/2 to 1 tablet each night prn. 90 tablet 1   ? metoprolol tartrate (LOPRESSOR) 25 MG tablet Take 1 tablet (25 mg total) by mouth 2 (two) times a day for 7 days. 14 tablet 0     No current facility-administered medications for this visit.      Physical Exam:  /84   Pulse 84   Resp 20   Wt 141 lb 1.6 oz (64 kg)   SpO2 98% Comment: RA  BMI 21.45 kg/m     Gen: Alert, oriented, no distress, dry hacking cough  HEENT: nasal turbinates are unremarkable, no oropharyngeal lesions, no cervical or supraclavicular " lymphadenopathy  CV: RRR, no M/G/R  Resp: CTAB, no focal crackles or wheezes  Abd: soft, nontender, no palpable organomegaly  Skin: no apparent rashes  Ext: no cyanosis, clubbing or edema  Neuro: alert, nonfocal    Labs:  Reviewed    Imaging studies:  Personally reviewed:    2/20/20 CXR:  Personally reviewed and interpreted:  Postop change in the lower lungs. Some stable small right pleural effusion or pleural thickening right lung base. No acute new findings.

## 2021-06-21 NOTE — LETTER
Letter by Dayday Meyer MD at      Author: Dayday Meyer MD Service: -- Author Type: --    Filed:  Encounter Date: 5/25/2021 Status: (Other)         Miley Tolliver MD  [facility]  [address] [facility] [address]                                  May 25, 2021    Patient: Katey Singh   MR Number: [id number]   YOB: 1950   Date of Visit: 5/25/2021     Dear Dr. Unruly MD:    Thank you for referring Katey Singh to me for evaluation. Below are the relevant portions of my assessment and plan of care.    If you have questions, please do not hesitate to call me. I look forward to following Katey along with you.    Sincerely,        Dayday Meyer MD          CC  No Recipients  Dayday Meyer MD  5/25/2021  9:10 AM  Sign when Signing Visit  Assessment and Plan:Katey Singh is a 70 y.o. female with a past medical history significant for blastomycosis diagnosed by lung resection given a presumptive needle biopsy showing cancer who presents to clinic today for follow up.  She finally had her PFTs done to screen for COPD given moderate emphysema on her CTs.  She presents to discuss this today..     1) COPD - per her PFTs she has moderate COPD, but relatively asymptomatic.  She is not quite ready to start spiriva or incruse, but she would like to think on it.  2) History of Blastomycosis - considered resolved, no symptoms  3) Nicotine dependence - it has been a year since her last CT and she is still eligible for screening.  Lung Cancer Screening pre-scan counseling Visit    The patient fits the risk profile of patients who benefit from this screening:  -The patient is >55 years old and <80 years old  -The patient has 50 pack year history (over 30)  -The patient has smoked within the past 15 years  -The patient has no medical comorbidity severe enough that it would cause mortality prior to mortality due to the lung cancer attempting to be  detected.    Discussion with patient regarding the harms associated with LDCT screening include false-negative and false-positive results, incidental findings, overdiagnosis, and radiation exposure were reviewed at length.   The patient understands that pursuing this screening test may result in a biopsy that was not necessary. It may also produced added stress over a nodule that is likely not cancer.    Of 100 patients who get screening, 25 will have a positive scan. Of those 25, only 1 will have cancer.  Overdiagnosis is estimated at 10% of patients-- they would not have been detected in the patient's lifetime without screening. Less than 1% of patients likely had death related to radiation exposure increase.   Average low-dose CT associated with 0.61 to 1.5 mSv. Annual background radiation exposure in the [place] averages 2.4 mS; mammogram is 0.7mSv.    The benefits are reduction in risk of death from lung cancer. The number needed to treat is 320 (for every 320 patients who undergo screening, 1 patient will have a benefit in mortality from early detection from the screening).    Undergoing this screening implies willingness to pursue further potentially invasive testing to discover potential cancer.    All questions were answered.    The patient was counseled regarding smoking cessation and its risk for lung cancer.      CCx: huang    HPI: Ms. Singh is a 70 year old female who presents for follow up of her PFTs.  She has emphysema and some shortness of breath with exertion.  Since we last saw her she had a mild cough with nasal drainage treated with prednisone.  On her last virtual visit she denied symptomology, but today she thinks she might be getting a little more short of breath. She doesn't have a daily cough.  She remains tobacco free.  She would like to get restarted on the lung cancer screening today.    ROS:  Review of Systems - History obtained from the patient  General ROS:  negative  Psychological ROS: negative  ENT ROS: negative  Allergy and Immunology ROS: negative  Endocrine ROS: negative  Respiratory ROS: positive for - shortness of breath  negative for - hemoptysis, orthopnea, pleuritic pain or tachypnea  Cardiovascular ROS: no chest pain or palpitations  Gastrointestinal ROS: no abdominal pain, change in bowel habits, or black or bloody stools  Genito-Urinary ROS: no dysuria, trouble voiding, or hematuria  Musculoskeletal ROS: negative  Neurological ROS: no TIA or stroke symptoms  Dermatological ROS: negative      Current Meds:  Current Outpatient Medications   Medication Sig   ? amLODIPine (NORVASC) 5 MG tablet TAKE 1 TABLET(5 MG) BY MOUTH DAILY   ? atorvastatin (LIPITOR) 20 MG tablet TAKE 1 TABLET BY MOUTH EVERY NIGHT AT BEDTIME   ? estradiol-norethindrone (ACTIVELLA) 1-0.5 mg per tablet Take 0.5 tablets by mouth at bedtime.    ? lisinopriL (PRINIVIL,ZESTRIL) 20 MG tablet Take 1 tablet (20 mg total) by mouth daily.       Labs:  No results found for this or any previous visit (from the past 72 hour(s)).    I have personally reviewed all pertinent imaging studies and PFT results unless otherwise noted.    PFTs 05/12/21    FEV1/FVC is 0.63 and is reduced.  FEV1 is 66% predicted and is reduced.  FVC is 81% predicted and is normal.  There was no improvement in spirometry after a single inhaled dose of bronchodilator.  DLCO is 60% predicted and is reduced when it   is corrected for hemoglobin.  The flow volume loop is normal No.     Impression:  Full Pulmonary Function Test is abnormal. Spirometry is consistent with moderate obstructive ventilatory defect.  Spirometry is not consistent with reversibility.  Diffusion capacity when corrected for hemoglobin is moderately reduced.    Imaging studies:  Cta Chest Pe Run    Result Date: 3/5/2020  EXAM: CTA CHEST PE RUN LOCATION: [facility] DATE/TIME: 3/5/2020 11:58 AM INDICATION:  Pleuritic right lower chest pain, recent air  "travel, status post right lower, middle and left lower lobe wedge resections 2 months ago  for benign, necrotizing granulomatous infection. COMPARISON: 01/29/2020 and multiple older studies TECHNIQUE: CT angiogram chest during arterial phase injection IV contrast. 2D and 3D MIP reconstructions were performed by the CT technologist. Dose reduction techniques were used. CONTRAST: Iohexol (Omni) 100 mL FINDINGS: ANGIOGRAM CHEST: Focal opacification of pulmonary arteries and branches. No evidence of pulmonary emboli. Thoracic aorta is normal caliber.  LUNGS AND PLEURA: Surgical changes noted in the right middle and both lower lobes. Moderate emphysema. No new parenchymal disease. MEDIASTINUM/AXILLAE: No adenopathy. UPPER ABDOMEN: Unremarkable. MUSCULOSKELETAL: Fractures or suspicious lesions.     1.  No evidence of pulmonary emboli. No abnormalities noted to explain pain. 2.  Moderate emphysema. 3.  Stable postthoracotomy changes with wedge resection in both lower lobes and right middle lobe.        Physical Exam:  /60   Pulse 77   Ht 5' 7.75\" (1.721 m)   Wt 151 lb (68.5 kg)   SpO2 98%   BMI 23.13 kg/m    General - Well nourished  Ears/Mouth -  Deferred given mask use during pandemic  Neck - no cervical lymphadenopathy  Lungs - Clear to ausculation bilaterally   CVS - regular rhythm with no murmurs, rubs or gallups  Abdomen - soft, NT, ND, NABS  Ext - no cyanosis, clubbing or edema  Skin - no rash  Psychology - alert and oriented, answers appropriate        Electronically signed by:    Dayday Meyer MD PhD  [facility] Pulmonary and Critical Care Medicine         "

## 2021-06-25 NOTE — TELEPHONE ENCOUNTER
Katey called to let Dr. Meyer know she will not be taking the new inhaler ( Spiriva ) it would cost her 322.00 a month and she wont do that. Will update Dr. Meyer.

## 2021-06-28 NOTE — PROGRESS NOTES
Progress Notes by Christian Dumas MD at 11/7/2019 10:00 AM     Author: Christian Dumas MD Service: -- Author Type: Physician    Filed: 11/7/2019  2:08 PM Encounter Date: 11/7/2019 Status: Signed    : Christian Dumas MD (Physician)       THORACIC SURGERY OFFICE NEW PATIENT VISIT      Dear Dr. Tolliver,    I saw Ms. Singh at Dr. Meyer' request in consultation for the evaluation and treatment of a 3 lung nodules, one of which is a biopsy-proven adenocarcinoma of the lung..     HPI  Mrs. Katey Singh is a 69 y.o. year-old female with incidentally found lung nodules. 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    Tests     CT chest (9/17/2019): 3 lung nodules (RLL 18 mm, RML 8 mm, LLL 16 mm). The RLL nodule is exactly between S9 and S10.            PET-CT (10/10/2019): RLL SUV 2.2, RML and LLL SUV<2  CT biopsy (10/28/2019): RLL = adenocarcinoma, lepidic predominant  PFT (11/6/2019): FEV1  82%, DLCO 96%    PMH    Past Medical History:   Diagnosis Date   ? Hypertension       Past Surgical History:   Procedure Laterality Date   ? CT BIOPSY LUNG  10/28/2019   ? ME LIGATE FALLOPIAN TUBE      Description: Tubal Ligation;  Proc Date: 01/01/1987;         ETOH social  TOB 10 pack years, quit 3 weeks ago    Physical examination  BMI 23  Appears younger than her stated age    From a personal perspective, she is here with her , Oh. They are physically active.      IMPRESSION   1. Lung nodule        69 y.o. year-old female with a cT1bN0 adenocarcinoma of the RIGHT lower lobe  2 additional lung nodules    PLAN  I spent a total of 45 minutes with Ms. Singh and her , Oh, more than 50% of which were spent in counseling, coordination of care, and face-to-face time. I reviewed the plan as follows:    1) Procedure planned: RIGHT VATS lower lobe segmentectomy (S9 and S10) and RIGHT middle lobe wedge resection, with possible thoracotomy.  I reviewed the  indications, risks, and benefits of the procedure with . Katey DA SILVA Francisco. We discussed the intraoperative risks of bleeding and injury to vital organs, potential postoperative complications including, but not limited to, major respiratory events, arrhythmia, bleeding, infection, reoperation, and death. I explained the anticipated hospital course (+/- 2-4 days) and postoperative recovery including pain control, chest drain management, and variable degrees of dyspnea (or need for supplemental oxygen) and fatigue that tend to get better with time.      2) LEFT lower lobe wedge vs segmentectomy at a later stage depending on final pathology    3) Enoxaparin 40 mg SQ in preop holding    They had all their questions answered and were in agreement with the plan.  I appreciate the opportunity to participate in the care of your patient and will keep you updated.    Sincerely,

## 2021-06-28 NOTE — PROGRESS NOTES
Progress Notes by Christian Dumas MD at 1/16/2020  1:00 PM     Author: Christian Dumas MD Service: -- Author Type: Physician    Filed: 1/16/2020  2:39 PM Encounter Date: 1/16/2020 Status: Signed    : Christian Dumas MD (Physician)       THORACIC SURGERY ESTABLISHED PATIENT OFFICE VISIT    Dear Dr. Tolliver,    I saw Ms. Singh in follow-up today. 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 NO evidence of malignancy.    COMPLICATIONS  None    INTERVAL STUDIES  CXR (today):       ETOH negative  TOB negative  BMI 22    SUBJECTIVE   Mrs. Singh is slowly recovering, surgery took a lot out of her, but she feels she is turning the corner. Two of her incisions are slightly weepy, one from the chest tube site and the other probably from a deep stitch.    IMPRESSION   1. Pulmonary nodules     2. Pulmonary granuloma (H)       69 year-old female 5 weeks S/P RIGHT lower lobe segmentectomy and bilateral wedge resections for pulmonary granulomatosis  NO EVIDENCE OF MALIGNANCY     PLAN  I reviewed the plan as follows:    1) Long-term follow-up with Dr. Meyer  2) Follow-up with me in 1 month if she continues to have issues with her incisions.        They had all their questions answered and were in agreement with the plan.  I appreciate the opportunity to participate in the care of your patient and will keep you updated.  Sincerely,

## 2021-07-01 ENCOUNTER — RECORDS - HEALTHEAST (OUTPATIENT)
Dept: FAMILY MEDICINE | Facility: CLINIC | Age: 71
End: 2021-07-01

## 2021-07-01 DIAGNOSIS — I10 HYPERTENSION, UNSPECIFIED TYPE: ICD-10-CM

## 2021-07-01 RX ORDER — LISINOPRIL 20 MG/1
TABLET ORAL
Qty: 90 TABLET | Refills: 0 | Status: SHIPPED | OUTPATIENT
Start: 2021-07-01 | End: 2021-10-29

## 2021-07-04 NOTE — TELEPHONE ENCOUNTER
Telephone Encounter by Miley Tolliver MD at 7/1/2021 11:44 PM     Author: Miley Tolliver MD Service: -- Author Type: Physician    Filed: 7/1/2021 11:45 PM Encounter Date: 6/26/2021 Status: Signed    : Miley Tolliver MD (Physician)       Please schedule patient for a either a med check or a CPE/wellness visit.

## 2021-07-05 ENCOUNTER — COMMUNICATION - HEALTHEAST (OUTPATIENT)
Dept: FAMILY MEDICINE | Facility: CLINIC | Age: 71
End: 2021-07-05

## 2021-07-05 DIAGNOSIS — I10 HYPERTENSION, UNSPECIFIED TYPE: ICD-10-CM

## 2021-07-05 RX ORDER — AMLODIPINE BESYLATE 5 MG/1
TABLET ORAL
Qty: 90 TABLET | Refills: 1 | Status: SHIPPED | OUTPATIENT
Start: 2021-07-05 | End: 2021-10-29

## 2021-07-05 NOTE — TELEPHONE ENCOUNTER
Telephone Encounter by Samantha Don RN at 7/5/2021  9:50 AM     Author: Samantha Don RN Service: -- Author Type: Registered Nurse    Filed: 7/5/2021  9:50 AM Encounter Date: 7/5/2021 Status: Signed    : Samantha Don RN (Registered Nurse)       RN cannot approve Refill Request    RN can NOT refill this medication PCP messaged that patient is overdue for Office Visit. Last office visit: 12/19/2019 Miley Tolliver MD Last Physical: 10/24/2019 Last MTM visit: Visit date not found Last visit same specialty: 12/19/2019 Miley Tolliver MD.  Next visit within 3 mo: Visit date not found  Next physical within 3 mo: Visit date not found      [facility] Connection Triage/Med Refill 7/5/2021    Requested Prescriptions   Pending Prescriptions Disp Refills   ? amLODIPine (NORVASC) 5 MG tablet [Pharmacy Med Name: AMLODIPINE BESYLATE 5MG TABLETS] 90 tablet 1     Sig: TAKE 1 TABLET(5 MG) BY MOUTH DAILY       Calcium-Channel Blockers Protocol Failed - 7/5/2021  7:56 AM        Failed - PCP or prescribing provider visit in past 12 months or next 3 months     Last office visit with prescriber/PCP: 12/19/2019 Miley Tolliver MD OR same dept: Visit date not found OR same specialty: 12/19/2019 Miley Tolliver MD  Last physical: 10/24/2019 Last MTM visit: Visit date not found   Next visit within 3 mo: Visit date not found  Next physical within 3 mo: Visit date not found  Prescriber OR PCP: Miley Tolliver MD  Last diagnosis associated with med order: 1. Hypertension, unspecified type  - amLODIPine (NORVASC) 5 MG tablet [Pharmacy Med Name: AMLODIPINE BESYLATE 5MG TABLETS]; TAKE 1 TABLET(5 MG) BY MOUTH DAILY  Dispense: 90 tablet; Refill: 1    If protocol passes may refill for 12 months if within 3 months of last provider visit (or a total of 15 months).             Passed - Blood pressure filed in past 12 months     BP Readings from Last 1 Encounters:   05/25/21 110/60

## 2021-07-06 VITALS
WEIGHT: 151 LBS | HEIGHT: 68 IN | HEART RATE: 77 BPM | DIASTOLIC BLOOD PRESSURE: 60 MMHG | SYSTOLIC BLOOD PRESSURE: 110 MMHG | OXYGEN SATURATION: 98 % | BODY MASS INDEX: 22.88 KG/M2

## 2021-07-07 NOTE — TELEPHONE ENCOUNTER
RN cannot approve Refill Request    RN can NOT refill this medication PCP messaged that patient is overdue for Office Visit. Last office visit: 12/19/2019 Miley Tolliver MD Last Physical: 10/24/2019 Last MTM visit: Visit date not found Last visit same specialty: 12/19/2019 Miley Tolliver MD.  Next visit within 3 mo: Visit date not found  Next physical within 3 mo: Visit date not found      [facility], [facility] Connection Triage/Med Refill 6/26/2021    Requested Prescriptions   Pending Prescriptions Disp Refills     lisinopriL (PRINIVIL,ZESTRIL) 20 MG tablet [Pharmacy Med Name: LISINOPRIL 20MG TABLETS] 90 tablet 3     Sig: TAKE 1 TABLET(20 MG) BY MOUTH DAILY       Ace Inhibitors Refill Protocol Failed - 6/26/2021  6:25 PM        Failed - PCP or prescribing provider visit in past 12 months       Last office visit with prescriber/PCP: 12/19/2019 Miley Tolliver MD OR same dept: Visit date not found OR same specialty: 12/19/2019 Miley Tolliver MD  Last physical: 10/24/2019 Last MTM visit: Visit date not found   Next visit within 3 mo: Visit date not found  Next physical within 3 mo: Visit date not found  Prescriber OR PCP: Miley Tolliver MD  Last diagnosis associated with med order: 1. Hypertension, unspecified type  - lisinopriL (PRINIVIL,ZESTRIL) 20 MG tablet [Pharmacy Med Name: LISINOPRIL 20MG TABLETS]; TAKE 1 TABLET(20 MG) BY MOUTH DAILY  Dispense: 90 tablet; Refill: 3    If protocol passes may refill for 12 months if within 3 months of last provider visit (or a total of 15 months).             Failed - Serum Potassium in past 12 months     No results found for: LN-POTASSIUM          Failed - Serum Creatinine in past 12 months     Creatinine   Date Value Ref Range Status   05/14/2020 0.82 0.60 - 1.10 mg/dL Final             Passed - Blood pressure filed in past 12 months     BP Readings from Last 1 Encounters:   05/25/21 110/60

## 2021-08-06 NOTE — PATIENT INSTRUCTIONS - HE
Patient Instructions by Miley Tolliver MD at 9/9/2019 11:20 AM     Author: Miley Tolliver MD Service: -- Author Type: Physician    Filed: 9/9/2019 12:51 PM Encounter Date: 9/9/2019 Status: Addendum    : Miley Tolliver MD (Physician)    Related Notes: Original Note by Miley Tolliver MD (Physician) filed at 9/9/2019 12:00 PM         Patient Education   Your Health Risk Assessment indicates you feel you are not in good emotional health.    Recreation   Recreation is not limited to sports and team events. It includes any activity that provides relaxation, interest, enjoyment, and exercise. Recreation provides an outlet for physical, mental, and social energy. It can give a sense of worth and achievement. It can help you stay healthy.    Mental Exercise and Social Involvement  Mental and emotional health is as important as physical health. Keep in touch with friends and family. Stay as active as possible. Continue to learn and challenge yourself.   Things you can do to stay mentally active are:    Learn something new, like a foreign language or musical instrument.     Play SCRABBLE or do crossword puzzles. If you cannot find people to play these games with you at home, you can play them with others on your computer through the Internet.     Join a games club--anything from card games to chess or checkers or lawn bowling.     Start a new hobby.     Go back to school.     Volunteer.     Read.     Keep up with world events.       Patient Education   Understanding Advance Care Planning  Advance care planning is the process of deciding ones own future medical care. It helps ensure that if you cant speak for yourself, your wishes can still be carried out. The plan is a series of legal documents that note a persons wishes. The documents vary by state. Advance care planning may be done when a person has a serious illness that is expected to get worse. It may be done before major surgery. And it can help you and your family be  prepared in case of a major illness or injury. Advance care planning helps with making decisions at these times.       A health care proxy is a person who acts as the voice of a patient when the patient cant speak for himself or herself. The name of this role varies by state. It may be called a Durable Medical Power of  or Durable Power of  for Healthcare. It may be called an agent, surrogate, or advocate. Or it may be called a representative or decision maker. It is an official duty that is identified by a legal document. The document also varies by state.    Why Is Advance Care Planning Important?  If a person communicates their healthcare wishes:    They will be given medical care that matches their values and goals.    Their family members will not be forced to make decisions in a crisis with no guidance.  Creating a Plan  Making an advance care plan is often done in 3 steps:    Thinking about ones wishes. To create an advance care plan, you should think about what kind of medical treatment you would want if you lose the ability to communicate. Are there any situations in which you would refuse or stop treatment? Are there therapies you would want or not want? And whom do you want to make decisions for you? There are many places to learn more about how to plan for your care. Ask your doctor or  for resources.    Picking a health care proxy. This means choosing a trusted person to speak for you only when you cant speak for yourself. When you cannot make medical decisions, your proxy makes sure the instructions in your advance care plan are followed. A proxy does not make decisions based on his or her own opinions. They must put aside those opinions and values if needed, and carry out your wishes.    Filling out the legal documents. There are several kinds of legal documents for advance care planning. Each one tells health care providers your wishes. The documents may vary by state.  They must be signed and may need to be witnessed or notarized. You can cancel or change them whenever you wish. Depending on your state, the documents may include a Healthcare Proxy form, Living Will, Durable Medical Power of , Advance Directive, or others.  The Familys Role  The best help a family can give is to support their loved ones wishes. Open and honest communication is vital. Family should express any concerns they have about the patients choices while the patient can still make decisions.    [id number] [employer]. [address] [place] [address]. All rights reserved. This information is not intended as a substitute for professional medical care. Always follow your healthcare professional's instructions.         Also, [facility] offers a free, downloadable health care directive that allows you to share your treatment choices and personal preferences if you cannot communicate your wishes. It also allows you to appoint another person (called a health care agent) to make health care decisions if you are unable to do so. You can download an advance directive by going here: [email or web address]     Patient Education   Personalized Prevention Plan  You are due for the preventive services outlined below.  Your care team is available to assist you in scheduling these services.  If you have already completed any of these items, please share that information with your care team to update in your medical record.  Health Maintenance   Topic Date Due   ? HEPATITIS C SCREENING  1950   ? ZOSTER VACCINES (1 of 2) 06/24/1998   ? INFLUENZA VACCINE RULE BASED (1) 08/01/2019   ? FALL RISK ASSESSMENT  08/10/2019   ? MEDICARE ANNUAL WELLNESS VISIT  08/10/2019   ? MAMMOGRAM  08/24/2019   ? TD 18+ HE  02/16/2020   ? DXA SCAN  08/28/2020   ? ADVANCE CARE PLANNING  08/10/2023   ? COLONOSCOPY  11/24/2025   ? PNEUMOCOCCAL POLYSACCHARIDE VACCINE AGE 65 AND  OVER  Completed   ? PNEUMOCOCCAL CONJUGATE VACCINE FOR ADULTS (PCV13 OR PREVNAR)  Completed

## 2021-10-10 ENCOUNTER — HEALTH MAINTENANCE LETTER (OUTPATIENT)
Age: 71
End: 2021-10-10

## 2021-10-29 ENCOUNTER — OFFICE VISIT (OUTPATIENT)
Dept: FAMILY MEDICINE | Facility: CLINIC | Age: 71
End: 2021-10-29
Payer: COMMERCIAL

## 2021-10-29 ENCOUNTER — MYC MEDICAL ADVICE (OUTPATIENT)
Dept: FAMILY MEDICINE | Facility: CLINIC | Age: 71
End: 2021-10-29

## 2021-10-29 VITALS
OXYGEN SATURATION: 98 % | SYSTOLIC BLOOD PRESSURE: 110 MMHG | BODY MASS INDEX: 22.7 KG/M2 | DIASTOLIC BLOOD PRESSURE: 70 MMHG | WEIGHT: 148.2 LBS | HEART RATE: 80 BPM

## 2021-10-29 DIAGNOSIS — I10 HYPERTENSION, UNSPECIFIED TYPE: ICD-10-CM

## 2021-10-29 DIAGNOSIS — E78.5 HYPERLIPIDEMIA LDL GOAL <130: Primary | ICD-10-CM

## 2021-10-29 DIAGNOSIS — G47.09 OTHER INSOMNIA: ICD-10-CM

## 2021-10-29 LAB
ALBUMIN SERPL-MCNC: 4.1 G/DL (ref 3.5–5)
ALP SERPL-CCNC: 74 U/L (ref 45–120)
ALT SERPL W P-5'-P-CCNC: 18 U/L (ref 0–45)
ANION GAP SERPL CALCULATED.3IONS-SCNC: 10 MMOL/L (ref 5–18)
AST SERPL W P-5'-P-CCNC: 16 U/L (ref 0–40)
BILIRUB SERPL-MCNC: 0.7 MG/DL (ref 0–1)
BUN SERPL-MCNC: 13 MG/DL (ref 8–28)
CALCIUM SERPL-MCNC: 10.3 MG/DL (ref 8.5–10.5)
CHLORIDE BLD-SCNC: 108 MMOL/L (ref 98–107)
CHOLEST SERPL-MCNC: 171 MG/DL
CO2 SERPL-SCNC: 24 MMOL/L (ref 22–31)
CREAT SERPL-MCNC: 0.86 MG/DL (ref 0.6–1.1)
ERYTHROCYTE [DISTWIDTH] IN BLOOD BY AUTOMATED COUNT: 13 % (ref 10–15)
FASTING STATUS PATIENT QL REPORTED: YES
GFR SERPL CREATININE-BSD FRML MDRD: 67 ML/MIN/1.73M2
GLUCOSE BLD-MCNC: 105 MG/DL (ref 70–125)
HCT VFR BLD AUTO: 46.8 % (ref 35–47)
HDLC SERPL-MCNC: 62 MG/DL
HGB BLD-MCNC: 15 G/DL (ref 11.7–15.7)
LDLC SERPL CALC-MCNC: 88 MG/DL
MCH RBC QN AUTO: 29.8 PG (ref 26.5–33)
MCHC RBC AUTO-ENTMCNC: 32.1 G/DL (ref 31.5–36.5)
MCV RBC AUTO: 93 FL (ref 78–100)
PLATELET # BLD AUTO: 222 10E3/UL (ref 150–450)
POTASSIUM BLD-SCNC: 5 MMOL/L (ref 3.5–5)
PROT SERPL-MCNC: 7.3 G/DL (ref 6–8)
RBC # BLD AUTO: 5.03 10E6/UL (ref 3.8–5.2)
SODIUM SERPL-SCNC: 142 MMOL/L (ref 136–145)
TRIGL SERPL-MCNC: 104 MG/DL
WBC # BLD AUTO: 8.2 10E3/UL (ref 4–11)

## 2021-10-29 PROCEDURE — 85027 COMPLETE CBC AUTOMATED: CPT | Performed by: FAMILY MEDICINE

## 2021-10-29 PROCEDURE — 99213 OFFICE O/P EST LOW 20 MIN: CPT | Performed by: FAMILY MEDICINE

## 2021-10-29 PROCEDURE — 80061 LIPID PANEL: CPT | Performed by: FAMILY MEDICINE

## 2021-10-29 PROCEDURE — 36415 COLL VENOUS BLD VENIPUNCTURE: CPT | Performed by: FAMILY MEDICINE

## 2021-10-29 PROCEDURE — 80053 COMPREHEN METABOLIC PANEL: CPT | Performed by: FAMILY MEDICINE

## 2021-10-29 RX ORDER — TRAZODONE HYDROCHLORIDE 50 MG/1
TABLET, FILM COATED ORAL
COMMUNITY
End: 2021-10-29

## 2021-10-29 RX ORDER — LISINOPRIL 10 MG/1
10 TABLET ORAL DAILY
Qty: 90 TABLET | Refills: 3 | Status: SHIPPED | OUTPATIENT
Start: 2021-10-29 | End: 2022-01-26

## 2021-10-29 RX ORDER — TRAZODONE HYDROCHLORIDE 50 MG/1
50 TABLET, FILM COATED ORAL
Qty: 90 TABLET | Refills: 3 | Status: SHIPPED | OUTPATIENT
Start: 2021-10-29 | End: 2022-12-07

## 2021-10-29 RX ORDER — LISINOPRIL 20 MG/1
20 TABLET ORAL DAILY
Qty: 90 TABLET | Refills: 3 | Status: CANCELLED | OUTPATIENT
Start: 2021-10-29

## 2021-10-29 NOTE — PROGRESS NOTES
HPI: Patient is a 72 yo female who presents for a medication recheck.    1) HTN: Patient notes that she is getting lightheaded on her blood pressure medication. No presyncope.  BP today is low at 110/70.  No chest pain or SOB.    2) Hyperlipidemia:   Takes atorvastatin before bedtime.  No difficulties with diffuse muscle aches.    3) Insomnia:  Brother passed away a year ago.  Had diabetes and obesity  Twice a week has difficulties falling asleep and up due to thought process.     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 Liseth Chen of [facility]         Current Outpatient Medications   Medication Instructions     atorvastatin (LIPITOR) 20 MG tablet [ATORVASTATIN (LIPITOR) 20 MG TABLET] TAKE 1 TABLET BY MOUTH EVERY NIGHT AT BEDTIME     estradiol-norethindrone (ACTIVELLA) 1-0.5 mg per tablet 0.5 tablets, AT BEDTIME     lisinopril (ZESTRIL) 10 mg, Oral, DAILY     traZODone (DESYREL) 50 mg, Oral, AT BEDTIME PRN     umeclidinium (INCRUSE ELLIPTA) 62.5 mcg/actuation DsDv inhaler 1 puff, Inhalation, DAILY     OBJECTIVE:  /70   Pulse 80   Wt 67.2 kg (148 lb 3.2 oz)   SpO2 98%   BMI 22.70 kg/m    Gen: Patient is alert and oriented, NAD.  HEENT: neck is supple without lymphadenopathy.  CV: RRR without murmur.  Lungs: CTAB without wheezes rales or rhonchi.  Extr: pulses 2+ no edema.      ,Katey was seen today for recheck medication.    Diagnoses and  all orders for this visit:    Hyperlipidemia LDL goal <130  -     Lipid Profile (Chol, Trig, HDL, LDL calc); Future  -     Comprehensive metabolic panel (BMP + Alb, Alk Phos, ALT, AST, Total. Bili, TP); Future  -     Lipid Profile (Chol, Trig, HDL, LDL calc)  -     Comprehensive metabolic panel (BMP + Alb, Alk Phos, ALT, AST, Total. Bili, TP)  Due for labs today.  No issues with atorvastatin.    Hypertension, unspecified type  -     CBC with platelets; Future  -     lisinopril (ZESTRIL) 10 MG tablet; Take 1 tablet (10 mg) by mouth daily  -     CBC with platelets  BP is low end of normal.  Patient is getting symptomatic with low Bps.  Will decrease lisinopril to 10 mg.   Check CBC to rule out anemia.    Other insomnia  -     traZODone (DESYREL) 50 MG tablet; Take 1 tablet (50 mg) by mouth nightly as needed for sleep  Discussed option for trazodone.  Reviewed use, potential side effects, and expected outcomes.

## 2021-11-12 ENCOUNTER — TRANSFERRED RECORDS (OUTPATIENT)
Dept: HEALTH INFORMATION MANAGEMENT | Facility: CLINIC | Age: 71
End: 2021-11-12
Payer: COMMERCIAL

## 2022-01-19 DIAGNOSIS — I10 HYPERTENSION: Primary | ICD-10-CM

## 2022-01-21 NOTE — TELEPHONE ENCOUNTER
"Routing refill request to provider for review/approval because:  Clarification needed from PCP. Amlodipine is active on [facility] medication list, but not [facility] medication list. Is patient to be on both Amlodipine and Lisinopril? Last office visit does not mention patient is on Amlodipine, only Lisinopril.    Last Written Prescription Date:  7/5/21  Last Fill Quantity: 90,  # refills: 1   Last office visit provider: 10/29/21      Requested Prescriptions   Pending Prescriptions Disp Refills     amLODIPine (NORVASC) 5 MG tablet [Pharmacy Med Name: AMLODIPINE BESYLATE 5MG TABLETS] 90 tablet      Sig: TAKE 1 TABLET(5 MG) BY MOUTH DAILY       Calcium Channel Blockers Protocol  Failed - 1/19/2022 12:24 PM        Failed - Medication is active on med list        Passed - Blood pressure under 140/90 in past 12 months     BP Readings from Last 3 Encounters:   10/29/21 110/70   05/25/21 110/60   02/20/20 (!) 148/84                 Passed - Recent (12 mo) or future (30 days) visit within the authorizing provider's specialty     Patient has had an office visit with the authorizing provider or a provider within the authorizing providers department within the previous 12 mos or has a future within next 30 days. See \"Patient Info\" tab in inbasket, or \"Choose Columns\" in Meds & Orders section of the refill encounter.              Passed - Patient is age 18 or older        Passed - No active pregnancy on record        Passed - Normal serum creatinine on file in past 12 months     Recent Labs   Lab Test 10/29/21  1138   CR 0.86       Ok to refill medication if creatinine is low          Passed - No positive pregnancy test in past 12 months             Samantha Don RN 01/21/22 3:15 PM  "

## 2022-01-25 ENCOUNTER — MYC MEDICAL ADVICE (OUTPATIENT)
Dept: FAMILY MEDICINE | Facility: CLINIC | Age: 72
End: 2022-01-25
Payer: COMMERCIAL

## 2022-01-25 DIAGNOSIS — I10 HYPERTENSION, UNSPECIFIED TYPE: ICD-10-CM

## 2022-01-26 RX ORDER — AMLODIPINE BESYLATE 5 MG/1
TABLET ORAL
Qty: 90 TABLET | Refills: 3 | Status: SHIPPED | OUTPATIENT
Start: 2022-01-26 | End: 2023-01-18

## 2022-01-26 RX ORDER — LISINOPRIL 10 MG/1
10 TABLET ORAL DAILY
Qty: 90 TABLET | Refills: 3 | Status: SHIPPED | OUTPATIENT
Start: 2022-01-26 | End: 2023-01-18

## 2022-01-29 DIAGNOSIS — E78.5 HYPERLIPIDEMIA LDL GOAL <130: ICD-10-CM

## 2022-01-31 RX ORDER — ATORVASTATIN CALCIUM 20 MG/1
TABLET, FILM COATED ORAL
Qty: 90 TABLET | Refills: 2 | Status: SHIPPED | OUTPATIENT
Start: 2022-01-31 | End: 2022-10-19

## 2022-01-31 NOTE — TELEPHONE ENCOUNTER
"Last Written Prescription Date:  2/8/21  Last Fill Quantity: 90,  # refills: 3   Last office visit provider:  10/29/21     Requested Prescriptions   Pending Prescriptions Disp Refills     atorvastatin (LIPITOR) 20 MG tablet [Pharmacy Med Name: ATORVASTATIN 20MG TABLETS] 90 tablet 3     Sig: TAKE 1 TABLET BY MOUTH EVERY NIGHT AT BEDTIME       Statins Protocol Passed - 1/29/2022  8:52 AM        Passed - LDL on file in past 12 months     Recent Labs   Lab Test 10/29/21  1138   LDL 88             Passed - No abnormal creatine kinase in past 12 months     No lab results found.             Passed - Recent (12 mo) or future (30 days) visit within the authorizing provider's specialty     Patient has had an office visit with the authorizing provider or a provider within the authorizing providers department within the previous 12 mos or has a future within next 30 days. See \"Patient Info\" tab in inbasket, or \"Choose Columns\" in Meds & Orders section of the refill encounter.              Passed - Medication is active on med list        Passed - Patient is age 18 or older        Passed - No active pregnancy on record        Passed - No positive pregnancy test in past 12 months             Fidel Bruno RN 01/31/22 2:08 PM  "

## 2022-06-11 ENCOUNTER — MYC MEDICAL ADVICE (OUTPATIENT)
Dept: FAMILY MEDICINE | Facility: CLINIC | Age: 72
End: 2022-06-11
Payer: COMMERCIAL

## 2022-06-11 DIAGNOSIS — D49.2 SKIN GROWTH: Primary | ICD-10-CM

## 2022-06-13 ENCOUNTER — MYC MEDICAL ADVICE (OUTPATIENT)
Dept: FAMILY MEDICINE | Facility: CLINIC | Age: 72
End: 2022-06-13
Payer: COMMERCIAL

## 2022-06-13 DIAGNOSIS — F17.211 CIGARETTE NICOTINE DEPENDENCE IN REMISSION: Primary | ICD-10-CM

## 2022-06-15 ENCOUNTER — MYC MEDICAL ADVICE (OUTPATIENT)
Dept: FAMILY MEDICINE | Facility: CLINIC | Age: 72
End: 2022-06-15
Payer: COMMERCIAL

## 2022-07-12 ENCOUNTER — HOSPITAL ENCOUNTER (OUTPATIENT)
Dept: CT IMAGING | Facility: HOSPITAL | Age: 72
Discharge: HOME | End: 2022-07-12
Attending: FAMILY MEDICINE | Admitting: FAMILY MEDICINE
Payer: COMMERCIAL

## 2022-07-12 DIAGNOSIS — F17.211 CIGARETTE NICOTINE DEPENDENCE IN REMISSION: ICD-10-CM

## 2022-07-12 PROCEDURE — 71271 CT THORAX LUNG CANCER SCR C-: CPT

## 2022-07-16 ENCOUNTER — HEALTH MAINTENANCE LETTER (OUTPATIENT)
Age: 72
End: 2022-07-16

## 2022-08-11 NOTE — TELEPHONE ENCOUNTER
DIAGNOSIS: Full rotation/extension of left shoulder very painful.   APPOINTMENT DATE: 8.25.22   NOTES STATUS DETAILS   MEDICATION LIST Internal

## 2022-08-19 DIAGNOSIS — M25.519 PAIN IN JOINT, SHOULDER REGION: Primary | ICD-10-CM

## 2022-08-24 NOTE — PROGRESS NOTES
[facility]  Sports Medicine Clinic  Cl[facility]ics and [facility] Center           SUBJECTIVE    Katey Singh is a 72 year old female presenting to clinic today with left shoulder pain.     Today, the patient reports that she has had left shoulder pain for several years intermittently. For the past 2 months, she's had more consistent left shoulder pain. Pain is worse with reaching, putting on a bra, overhead activities. The pain is located over shoulder joint. Denies any paresthesias. She is not currently taking any medication for the pain. She has never been to physical therapy or received any steroid injections into the left shoulder.  She is right hand dominant.       PMH, Medications and Allergies were reviewed and updated as needed.    ROS:  As noted above otherwise negative.    Patient Active Problem List   Diagnosis     Menopause     Joint Pain, Localized In The Knee     Mixed hyperlipidemia     Calcified granuloma of lung (H)     Hypertension     S/P thoracotomy     Migraine       Current Outpatient Medications   Medication Sig Dispense Refill     amLODIPine (NORVASC) 5 MG tablet TAKE 1 TABLET(5 MG) BY MOUTH DAILY 90 tablet 3     atorvastatin (LIPITOR) 20 MG tablet TAKE 1 TABLET BY MOUTH EVERY NIGHT AT BEDTIME 90 tablet 2     estradiol-norethindrone (ACTIVELLA) 1-0.5 mg per tablet [ESTRADIOL-NORETHINDRONE (ACTIVELLA) 1-0.5 MG PER TABLET] Take 0.5 tablets by mouth at bedtime.        lisinopril (ZESTRIL) 10 MG tablet Take 1 tablet (10 mg) by mouth daily 90 tablet 3     traZODone (DESYREL) 50 MG tablet Take 1 tablet (50 mg) by mouth nightly as needed for sleep 90 tablet 3     umeclidinium (INCRUSE ELLIPTA) 62.5 mcg/actuation DsDv inhaler [UMECLIDINIUM (INCRUSE ELLIPTA) 62.5 MCG/ACTUATION DSDV INHALER] Inhale 1 puff daily. 30 each 12            OBJECTIVE:       Vitals:   Vitals:    08/25/22 1004   Weight: 67.1 kg (148 lb)     BMI: Body mass index is 22.67 kg/m .    Gen:  Well nourished and in no acute  "distress  HEENT: Extraocular movement intact  Neck: Supple  Pulm:  Breathing Comfortably. No increased respiratory effort.  Psych: Euthymic. Appropriately answers questions    MSK: Left shoulder without visible deformity.  No ecchymosis or edema about the shoulder joint.  No discernible tenderness to palpation of the superior, anterior, posterior, or lateral shoulder.  Range of motion mildly limited in flexion and abduction to 90 degrees.  No external rotation deficits.  Internal rotation limited to the level of the iliac crest on the left.  Rotator cuff strength testing actually full and intact at 5/5.  Biceps, triceps, and deltoid strength 5/5.  Mildly positive impingement testing with Neer's.  Negative Grant.  Negative belly liftoff and press soft, drop arm, empty can, and Hornblower.  Negative [place].  Negative [place].  Negative speeds/Yergason's.  Mildly positive apprehension test with negative sulcus testing, to the posterior joint line.      XRAY : Mild degenerative changes with osteophyte formation of the glenohumeral joint on the left.  Will await official radiologic read.          ASSESSMENT and PLAN:     Katey was seen today for pain.    Diagnoses and all orders for this visit:    Localized osteoarthritis of left shoulder  -     Physical Therapy Referral; Future    Tendinopathy of left rotator cuff  -     Physical Therapy Referral; Future    Degenerative tear of glenoid labrum of left shoulder  -     Physical Therapy Referral; Future      72-year-old female presenting to clinic today with left shoulder pain.  The patient did have what appears to sound like a rotator cuff injury multiple years back when reaching in the backseat, and felt a \"twinge\", in the left arm.  The pain is undulating however.  She does not use Tylenol and anti-inflammatories when needed and it helps.  She does have some pain with certain ranges of motion as described above.  Based on my examination, I believe there may be 3 " entities of the left shoulder that are going on that are listed above.  I discussed options with the patient in terms of further imaging versus starting physical therapy.  The patient would much rather start physical therapy at this time.  I think that is reasonable.  We will likely see her again in 6 to 8 weeks, if she is not having any improvement, MRI of the left shoulder can be obtained.      Options for treatment and/or follow-up care were reviewed with the patient was actively involved in the decision making process. Patient verbalized understanding and was in agreement with the plan.    Toy Chen DO  , Sports Medicine  Department of Family Medicine and [facility]

## 2022-08-25 ENCOUNTER — OFFICE VISIT (OUTPATIENT)
Dept: ORTHOPEDICS | Facility: CLINIC | Age: 72
End: 2022-08-25
Payer: COMMERCIAL

## 2022-08-25 ENCOUNTER — ANCILLARY PROCEDURE (OUTPATIENT)
Dept: GENERAL RADIOLOGY | Facility: CLINIC | Age: 72
End: 2022-08-25
Attending: STUDENT IN AN ORGANIZED HEALTH CARE EDUCATION/TRAINING PROGRAM
Payer: COMMERCIAL

## 2022-08-25 ENCOUNTER — PRE VISIT (OUTPATIENT)
Dept: ORTHOPEDICS | Facility: CLINIC | Age: 72
End: 2022-08-25

## 2022-08-25 VITALS — BODY MASS INDEX: 22.67 KG/M2 | WEIGHT: 148 LBS

## 2022-08-25 DIAGNOSIS — M19.012 LOCALIZED OSTEOARTHRITIS OF LEFT SHOULDER: Primary | ICD-10-CM

## 2022-08-25 DIAGNOSIS — M24.112 DEGENERATIVE TEAR OF GLENOID LABRUM OF LEFT SHOULDER: ICD-10-CM

## 2022-08-25 DIAGNOSIS — M25.519 PAIN IN JOINT, SHOULDER REGION: ICD-10-CM

## 2022-08-25 DIAGNOSIS — M67.912 TENDINOPATHY OF LEFT ROTATOR CUFF: ICD-10-CM

## 2022-08-25 PROCEDURE — 99203 OFFICE O/P NEW LOW 30 MIN: CPT | Performed by: STUDENT IN AN ORGANIZED HEALTH CARE EDUCATION/TRAINING PROGRAM

## 2022-08-25 PROCEDURE — 73030 X-RAY EXAM OF SHOULDER: CPT | Mod: LT | Performed by: RADIOLOGY

## 2022-08-25 NOTE — LETTER
8/25/2022      RE: Katey Singh  [address]  [place] [address]     Dear Colleague,    Thank you for referring your patient, Katey Singh, to the [facility] [place]. Please see a copy of my visit note below.    [facility]  Sports Medicine Clinic  Clinics and Surgery Center           SUBJECTIVE    Katey Singh is a 72 year old female presenting to clinic today with left shoulder pain.     Today, the patient reports that she has had left shoulder pain for several years intermittently. For the past 2 months, she's had more consistent left shoulder pain. Pain is worse with reaching, putting on a bra, overhead activities. The pain is located over shoulder joint. Denies any paresthesias. She is not currently taking any medication for the pain. She has never been to physical therapy or received any steroid injections into the left shoulder.  She is right hand dominant.       PMH, Medications and Allergies were reviewed and updated as needed.    ROS:  As noted above otherwise negative.    Patient Active Problem List   Diagnosis     Menopause     Joint Pain, Localized In The Knee     Mixed hyperlipidemia     Calcified granuloma of lung (H)     Hypertension     S/P thoracotomy     Migraine       Current Outpatient Medications   Medication Sig Dispense Refill     amLODIPine (NORVASC) 5 MG tablet TAKE 1 TABLET(5 MG) BY MOUTH DAILY 90 tablet 3     atorvastatin (LIPITOR) 20 MG tablet TAKE 1 TABLET BY MOUTH EVERY NIGHT AT BEDTIME 90 tablet 2     estradiol-norethindrone (ACTIVELLA) 1-0.5 mg per tablet [ESTRADIOL-NORETHINDRONE (ACTIVELLA) 1-0.5 MG PER TABLET] Take 0.5 tablets by mouth at bedtime.        lisinopril (ZESTRIL) 10 MG tablet Take 1 tablet (10 mg) by mouth daily 90 tablet 3     traZODone (DESYREL) 50 MG tablet Take 1 tablet (50 mg) by mouth nightly as needed for sleep 90 tablet 3     umeclidinium (INCRUSE ELLIPTA) 62.5 mcg/actuation DsDv inhaler  [UMECLIDINIUM (INCRUSE ELLIPTA) 62.5 MCG/ACTUATION DSDV INHALER] Inhale 1 puff daily. 30 each 12            OBJECTIVE:       Vitals:   Vitals:    08/25/22 1004   Weight: 67.1 kg (148 lb)     BMI: Body mass index is 22.67 kg/m .    Gen:  Well nourished and in no acute distress  HEENT: Extraocular movement intact  Neck: Supple  Pulm:  Breathing Comfortably. No increased respiratory effort.  Psych: Euthymic. Appropriately answers questions    MSK: Left shoulder without visible deformity.  No ecchymosis or edema about the shoulder joint.  No discernible tenderness to palpation of the superior, anterior, posterior, or lateral shoulder.  Range of motion mildly limited in flexion and abduction to 90 degrees.  No external rotation deficits.  Internal rotation limited to the level of the iliac crest on the left.  Rotator cuff strength testing actually full and intact at 5/5.  Biceps, triceps, and deltoid strength 5/5.  Mildly positive impingement testing with Neer's.  Negative [place].  Negative belly liftoff and press soft, drop arm, empty can, and Hornblower.  Negative [place].  Negative [place].  Negative speeds/Yergason's.  Mildly positive apprehension test with negative sulcus testing, to the posterior joint line.      XRAY : Mild degenerative changes with osteophyte formation of the glenohumeral joint on the left.  Will await official radiologic read.          ASSESSMENT and PLAN:     Katey was seen today for pain.    Diagnoses and all orders for this visit:    Localized osteoarthritis of left shoulder  -     Physical Therapy Referral; Future    Tendinopathy of left rotator cuff  -     Physical Therapy Referral; Future    Degenerative tear of glenoid labrum of left shoulder  -     Physical Therapy Referral; Future      72-year-old female presenting to clinic today with left shoulder pain.  The patient did have what appears to sound like a rotator cuff injury multiple years back when reaching in the backseat, and  "felt a \"twinge\", in the left arm.  The pain is undulating however.  She does not use Tylenol and anti-inflammatories when needed and it helps.  She does have some pain with certain ranges of motion as described above.  Based on my examination, I believe there may be 3 entities of the left shoulder that are going on that are listed above.  I discussed options with the patient in terms of further imaging versus starting physical therapy.  The patient would much rather start physical therapy at this time.  I think that is reasonable.  We will likely see her again in 6 to 8 weeks, if she is not having any improvement, MRI of the left shoulder can be obtained.      Options for treatment and/or follow-up care were reviewed with the patient was actively involved in the decision making process. Patient verbalized understanding and was in agreement with the plan.    Toy Chen DO  , Sports Medicine  Department of [facility]      Today, the patient reports that she has had left shoulder pain for several years intermittently. For the past 2 months, she's had more consistent left shoulder pain. Pain is worse with reaching, putting on a bra, overhead activities. The pain is located over shoulder joint. Denies any paresthesias. She is not currently taking any medication for the pain. She has never been to physical therapy or received any steroid injections into the left shoulder.  She is right hand dominant.       Again, thank you for allowing me to participate in the care of your patient.      Sincerely,    Toy Chen, DO    "

## 2022-08-25 NOTE — PROGRESS NOTES
Today, the patient reports that she has had left shoulder pain for several years intermittently. For the past 2 months, she's had more consistent left shoulder pain. Pain is worse with reaching, putting on a bra, overhead activities. The pain is located over shoulder joint. Denies any paresthesias. She is not currently taking any medication for the pain. She has never been to physical therapy or received any steroid injections into the left shoulder.  She is right hand dominant.

## 2022-09-18 ENCOUNTER — HEALTH MAINTENANCE LETTER (OUTPATIENT)
Age: 72
End: 2022-09-18

## 2022-10-03 ENCOUNTER — HOSPITAL ENCOUNTER (OUTPATIENT)
Dept: PHYSICAL THERAPY | Facility: REHABILITATION | Age: 72
Discharge: HOME | End: 2022-10-03
Attending: STUDENT IN AN ORGANIZED HEALTH CARE EDUCATION/TRAINING PROGRAM
Payer: COMMERCIAL

## 2022-10-03 DIAGNOSIS — M19.012 LOCALIZED OSTEOARTHRITIS OF LEFT SHOULDER: ICD-10-CM

## 2022-10-03 DIAGNOSIS — M24.112 DEGENERATIVE TEAR OF GLENOID LABRUM OF LEFT SHOULDER: ICD-10-CM

## 2022-10-03 DIAGNOSIS — M67.912 TENDINOPATHY OF LEFT ROTATOR CUFF: ICD-10-CM

## 2022-10-03 PROCEDURE — 97110 THERAPEUTIC EXERCISES: CPT | Mod: GP

## 2022-10-03 PROCEDURE — 97161 PT EVAL LOW COMPLEX 20 MIN: CPT | Mod: GP

## 2022-10-03 NOTE — PROGRESS NOTES
[facility]    OUTPATIENT PHYSICAL THERAPY ORTHOPEDIC EVALUATION  PLAN OF TREATMENT FOR OUTPATIENT REHABILITATION  (COMPLETE FOR INITIAL CLAIMS ONLY)  Patient's Last Name, First Name, M.I.  YOB: 1950  Katey iSngh    Provider s Name:  [facility]   Medical Record No.  [id number]   Start of Care Date:  10/03/22   Onset Date:  08/03/22   Type:     _X__PT   ___OT   ___SLP Medical Diagnosis:  L shouder pain     PT Diagnosis:  L shoulder impingment   Visits from SOC:  1      _________________________________________________________________________________  Plan of Treatment/Functional Goals:  manual therapy, joint mobilization, ROM, strengthening, stretching           Goals  Goal Identifier: HEP/Self managmemnent  Goal Description: Patient will be independent in self-management of condition and HEP to reach functional goals.  Target Date: 12/26/22    Goal Identifier: IR  Goal Description: Pt will be able to reach to level of T8 or higher with pain level <2/10 to allow for hooking undergarments to dress  Target Date: 12/26/22    Goal Identifier: ABD  Goal Description: Pt will be able to abd arm to >90 with pain level <2/10 to allow for putting on seatbelt and cooking acitivits.       Goal Identifier: Overhead  Goal Description: Pt will be able to reach overhead >130 with pain level <2/10 to allow for putting items away on shelf in kitchen  Target Date: 12/26/22                                                Therapy Frequency:  1 time/week  Predicted Duration of Therapy Intervention:  For 4 weeks taper to every other for 2 visits (6 total)    JEREMIAH RESTREPO, PT                 I CERTIFY THE NEED FOR THESE SERVICES FURNISHED UNDER        THIS PLAN OF TREATMENT AND WHILE UNDER MY CARE     (Physician co-signature of this document indicates review and certification of the  therapy plan).                     Certification Date From:  10/03/22   Certification Date To:  12/26/22    Referring Provider:  Toy Chen, DO    Initial Assessment        See Epic Evaluation Start of Care Date: 10/03/22                                                                               10/03/22 0800   General Information   Type of Visit Initial OP Ortho PT Evaluation   Start of Care Date 10/03/22   Referring Physician Toy Chen, DO   Patient/Family Goals Statement Regain movment   Orders Evaluate and Treat   Date of Order 08/25/22   Certification Required? Yes   Medical Diagnosis Localized osteoarthritis of left shoulder/Tendinopathy of left rotator cuff/  Degenerative tear of glenoid labrum of left shoulder   Surgical/Medical history reviewed Yes   Precautions/Limitations no known precautions/limitations   Body Part(s)   Body Part(s) Shoulder   Presentation and Etiology   Pertinent history of current problem (include personal factors and/or comorbidities that impact the POC) Pt arrives with c/o L shoulder pin with reaching up overhead. Reports initial injury involved reaching back for item in car about 10 years prior - pain comes and goes. Most recent oncet occurred from similar mechanism of injury resulting in reaching back for something from front seat of car, this bout pain has increased and persisted. Not a sustained pain, rather a sharp pain with certain movements and positions limiting ADLs, cooking in kitchen and sleeping and has progressively worsened since.   Impairments A. Pain;D. Decreased ROM;E. Decreased flexibility   Functional Limitations perform activities of daily living;perform required work activities   Symptom Location L shoulder, deep in joint   How/Where did it occur With repetition/overuse;Other  (Reaching back in car)   Onset date of current episode/exacerbation 08/03/22   Chronicity Recurrent   Pain rating (0-10 point scale) Best (/10);Worst (/10)   Best (/10) 0/10    Worst (/10) 10/10   Pain quality A. Sharp;D. Burning   Frequency of pain/symptoms C. With activity   Pain/symptoms are: Other   Pain symptoms comment WIth activity   Pain/symptoms exacerbated by G. Certain positions;H. Overhead reach;J. ADL;K. Home tasks;C. Lifting   Pain/symptoms eased by F. Certain positions;C. Rest   Progression of symptoms since onset: Worsened   Prior Level of Function   Prior Level of Function-Mobility IND with all ADLs   Current Level of Function   Living environment [facility]  ([facility] )   Fall Risk Screen   Fall screen completed by PT   Have you fallen 2 or more times in the past year? No   Have you fallen and had an injury in the past year? No   Is patient a fall risk? No   Abuse Screen (yes response referral indicated)   Feels Unsafe at Home or Work/School no   Feels Threatened by Someone no   Does Anyone Try to Keep You From Having Contact with Others or Doing Things Outside Your Home? no   Physical Signs of Abuse Present no   System Outcome Measures   Outcome Measures   (SPADI 44/130 = 34%)   Shoulder Objective Findings   Side (if bilateral, select both right and left) Left   Cervical Screen (ROM, quadrant) WFL   Shoulder ROM Comment Generalized stiffness, firm end feel. R shoulder unable to reach full expected ROM   Scapulothoracic Rhythm WFL   Neer's Test +   Grant-Andrew Test +   Toledo's Test -   Crossover Test -   Palpation TTP at anterior coracoid   Accessory Motion/Joint Mobility WFL B, guarding with PROM on L side   Observation Observed pt able to reach overhead to fix hair without significant difficulty   Posture Good upright and sitting posture   Left Shoulder Flexion AROM L 130, R 155   Left Shoulder Flexion PROM L 110, R 150   Left Shoulder Abduction AROM L 70, R 155   Left Shoulder Abduction PROM L 90, R 150   Left Shoulder ER AROM WFL 60 B   Left Shoulder IR AROM L To beltline, R to T7   Left Shoulder IR PROM WFL B   Left Shoulder Flexion Strength 5/5   Left  Shoulder Abduction Strength 5/5   Left Shoulder ER Strength 5/5   Left Shoulder IR Strength 5/5   Planned Therapy Interventions   Planned Therapy Interventions manual therapy;joint mobilization;ROM;strengthening;stretching   Clinical Impression   Criteria for Skilled Therapeutic Interventions Met yes, treatment indicated   PT Diagnosis L shoulder impingment   Influenced by the following impairments Pain, decreased ROM/stiffness, decreased activity tolerance   Functional limitations due to impairments Inability to perform ADLs: dressing, cooking, cleaning and reaching for items laterally.   Clinical Presentation Stable/Uncomplicated   Clinical Presentation Rationale Clinical judgement   Clinical Decision Making (Complexity) Low complexity   Therapy Frequency 1 time/week   Predicted Duration of Therapy Intervention (days/wks) For 4 weeks taper to every other for 2 visits (6 total)   Risk & Benefits of therapy have been explained Yes   Patient, Family & other staff in agreement with plan of care Yes   Clinical Impression Comments 71 yo female presents to therapy with complaints of L shoulder pain that initally started 10 years prior and has intermittently come and gone since. Most recent reinjury resulted in persistent pain with abduction and IR being greatest limiting factor. Upon eval, signs and symptoms suggest shoulder impingement/arthropathy as demonstrated by decreased PROM, + HK, + neers, and painful arc of motion. Skilled PT services to progress functional mobility to towards IND with ADLs.   ORTHO GOALS   PT Ortho Eval Goals 1;2;3;4   Ortho Goal 1   Goal Identifier HEP/Self managmemnent   Goal Description Patient will be independent in self-management of condition and HEP to reach functional goals.   Target Date 12/26/22   Ortho Goal 2   Goal Identifier IR   Goal Description Pt will be able to reach to level of T8 or higher with pain level <2/10 to allow for hooking undergarments to dress   Target Date  12/26/22   Ortho Goal 3   Goal Identifier ABD   Goal Description Pt will be able to abd arm to >90 with pain level <2/10 to allow for putting on seatbelt and cooking acitivits.   Ortho Goal 4   Goal Identifier Overhead   Goal Description Pt will be able to reach overhead >130 with pain level <2/10 to allow for putting items away on shelf in kitchen   Target Date 12/26/22   Total Evaluation Time   PT Eval, Low Complexity Minutes (71613) 20   Therapy Certification   Certification date from 10/03/22   Certification date to 12/26/22   Medical Diagnosis L shouder pain

## 2022-10-03 NOTE — PLAN OF CARE
[facility]    OUTPATIENT PHYSICAL THERAPY ORTHOPEDIC EVALUATION  PLAN OF TREATMENT FOR OUTPATIENT REHABILITATION  (COMPLETE FOR INITIAL CLAIMS ONLY)  Patient's Last Name, First Name, M.I.  YOB: 1950  Katey Singh    Provider s Name:  [facility]   Medical Record No.  [id number]   Start of Care Date:  10/03/22   Onset Date:      Type:     _X__PT   ___OT   ___SLP Medical Diagnosis:  L shouder pain     PT Diagnosis:      Visits from SOC:  1      _________________________________________________________________________________  Plan of Treatment/Functional Goals:              Goals  Goal Identifier: HEP/Self managmemnent  Goal Description: Patient will be independent in self-management of condition and HEP to reach functional goals.  Target Date: 12/26/22                                                                                 Therapy Frequency:     Predicted Duration of Therapy Intervention:       JEREMIAH RESTREPO, PT                 I CERTIFY THE NEED FOR THESE SERVICES FURNISHED UNDER        THIS PLAN OF TREATMENT AND WHILE UNDER MY CARE     (Physician co-signature of this document indicates review and certification of the therapy plan).                     Certification Date From:  10/03/22   Certification Date To:  12/26/22    Referring Provider:  Toy Chen DO    Initial Assessment        See Epic Evaluation Start of Care Date: 10/03/22

## 2022-10-10 ENCOUNTER — HOSPITAL ENCOUNTER (OUTPATIENT)
Dept: PHYSICAL THERAPY | Facility: REHABILITATION | Age: 72
Discharge: HOME | End: 2022-10-10
Attending: STUDENT IN AN ORGANIZED HEALTH CARE EDUCATION/TRAINING PROGRAM
Payer: COMMERCIAL

## 2022-10-10 DIAGNOSIS — M25.512 CHRONIC LEFT SHOULDER PAIN: Primary | ICD-10-CM

## 2022-10-10 DIAGNOSIS — G89.29 CHRONIC LEFT SHOULDER PAIN: Primary | ICD-10-CM

## 2022-10-10 PROCEDURE — 97110 THERAPEUTIC EXERCISES: CPT | Mod: GP

## 2022-10-17 ENCOUNTER — HOSPITAL ENCOUNTER (OUTPATIENT)
Dept: PHYSICAL THERAPY | Facility: REHABILITATION | Age: 72
Discharge: HOME | End: 2022-10-17
Attending: STUDENT IN AN ORGANIZED HEALTH CARE EDUCATION/TRAINING PROGRAM
Payer: COMMERCIAL

## 2022-10-17 DIAGNOSIS — M25.512 CHRONIC LEFT SHOULDER PAIN: Primary | ICD-10-CM

## 2022-10-17 DIAGNOSIS — G89.29 CHRONIC LEFT SHOULDER PAIN: Primary | ICD-10-CM

## 2022-10-17 PROCEDURE — 97110 THERAPEUTIC EXERCISES: CPT | Mod: GP

## 2022-10-17 PROCEDURE — 97140 MANUAL THERAPY 1/> REGIONS: CPT | Mod: GP

## 2022-10-19 ENCOUNTER — DOCUMENTATION ONLY (OUTPATIENT)
Dept: ORTHOPEDICS | Facility: CLINIC | Age: 72
End: 2022-10-19

## 2022-10-19 DIAGNOSIS — E78.5 HYPERLIPIDEMIA LDL GOAL <130: ICD-10-CM

## 2022-10-19 RX ORDER — ATORVASTATIN CALCIUM 20 MG/1
20 TABLET, FILM COATED ORAL AT BEDTIME
Qty: 90 TABLET | Refills: 0 | Status: SHIPPED | OUTPATIENT
Start: 2022-10-19 | End: 2023-01-18

## 2022-10-19 NOTE — TELEPHONE ENCOUNTER
"Last Written Prescription Date:  1/31/22  Last Fill Quantity: 90,  # refills: 2   Last office visit provider:  10/29/21     Requested Prescriptions   Pending Prescriptions Disp Refills     atorvastatin (LIPITOR) 20 MG tablet [Pharmacy Med Name: ATORVASTATIN 20MG TABLETS] 90 tablet 2     Sig: TAKE 1 TABLET BY MOUTH EVERY NIGHT AT BEDTIME       Statins Protocol Passed - 10/19/2022  1:46 PM        Passed - LDL on file in past 12 months     Recent Labs   Lab Test 10/29/21  1138   LDL 88             Passed - No abnormal creatine kinase in past 12 months     No lab results found.             Passed - Recent (12 mo) or future (30 days) visit within the authorizing provider's specialty     Patient has had an office visit with the authorizing provider or a provider within the authorizing providers department within the previous 12 mos or has a future within next 30 days. See \"Patient Info\" tab in inbasket, or \"Choose Columns\" in Meds & Orders section of the refill encounter.              Passed - Medication is active on med list        Passed - Patient is age 18 or older        Passed - No active pregnancy on record        Passed - No positive pregnancy test in past 12 months             Fidel Bruno RN 10/19/22 1:46 PM  "

## 2022-10-20 ENCOUNTER — TRANSFERRED RECORDS (OUTPATIENT)
Dept: HEALTH INFORMATION MANAGEMENT | Facility: CLINIC | Age: 72
End: 2022-10-20

## 2022-11-02 ENCOUNTER — HOSPITAL ENCOUNTER (OUTPATIENT)
Dept: PHYSICAL THERAPY | Facility: REHABILITATION | Age: 72
Discharge: HOME | End: 2022-11-02
Payer: COMMERCIAL

## 2022-11-02 PROCEDURE — 97110 THERAPEUTIC EXERCISES: CPT | Mod: GP

## 2022-11-02 PROCEDURE — 97140 MANUAL THERAPY 1/> REGIONS: CPT | Mod: GP

## 2022-11-16 ENCOUNTER — HOSPITAL ENCOUNTER (OUTPATIENT)
Dept: PHYSICAL THERAPY | Facility: REHABILITATION | Age: 72
Discharge: HOME | End: 2022-11-16
Payer: COMMERCIAL

## 2022-11-16 PROCEDURE — 97140 MANUAL THERAPY 1/> REGIONS: CPT | Mod: GP

## 2022-11-16 PROCEDURE — 97110 THERAPEUTIC EXERCISES: CPT | Mod: GP

## 2022-11-30 ENCOUNTER — HOSPITAL ENCOUNTER (OUTPATIENT)
Dept: PHYSICAL THERAPY | Facility: REHABILITATION | Age: 72
Discharge: HOME | End: 2022-11-30
Payer: COMMERCIAL

## 2022-11-30 PROCEDURE — 97110 THERAPEUTIC EXERCISES: CPT | Mod: GP

## 2022-11-30 PROCEDURE — 97140 MANUAL THERAPY 1/> REGIONS: CPT | Mod: GP

## 2022-11-30 NOTE — PROGRESS NOTES
[facility]    OUTPATIENT PHYSICAL THERAPY  PLAN OF TREATMENT FOR OUTPATIENT REHABILITATION AND PROGRESS NOTE           Patient's Last Name, First Name, Katey Aguiar Date of Birth  1950   Provider's Name  [facility] Medical Record No.  [id number]    Onset Date  10/3/22 Start of Care Date  10/3/22   Type:     _X_PT   ___OT   ___SLP Medical Diagnosis  Localized osteoarthritis of left shoulder, tendinopathy of left rotator cuff, degenerative tear of glenoid labrum of left shoulder    PT Diagnosis  L shoulder tendinopathy Plan of Treatment  Frequency/Duration: 1x/wk every other week   Certification date from 11/30/22 to 2/22/23     Goals:  Goal Identifier HEP/Self managmemnent   Goal Description Patient will be independent in self-management of condition and HEP to reach functional goals.   Target Date 02/22/23   Date Met      Progress (detail required for progress note): In progress     Goal Identifier IR   Goal Description Pt will be able to reach to level of T8 or higher with pain level <2/10 to allow for hooking undergarments to dress   Target Date 02/22/23   Date Met      Progress (detail required for progress note): In progress - able to unhook undergarment with 4/5 pain     Goal Identifier ABD   Goal Description Pt will be able to abd arm to >90 with pain level <2/10 to allow for putting on seatbelt and cooking acitivits.   Target Date 02/22/23   Date Met      Progress (detail required for progress note): 120 deg with pain level 5/10 pain     Goal Identifier Overhead   Goal Description Pt will be able to reach overhead >130 with pain level <2/10 to allow for putting items away on shelf in kitchen   Target Date 12/26/22   Date Met  11/16/22   Progress (detail required for progress note):       Beginning/End Dates of Progress Note Reporting  Period:  10/3/22 to 11/30/22     Progress Toward Goals:   Progress this reporting period: Pt is making progress towards goals, would benefit from additional 4 visits to continue to progress.     Client Self (Subjective) Report for Progress Note Reporting Period: Pt arrives today reporting feeling discouraged, more pain with all motions though feels like ROM is improving. Continues to have persistent pain with abduction and IR up back. Feels like shoulder has stiffened up. Now feels like unable to touch base of bra strap. Reports manual therapy has been helpful. HEP daily, they are going well however increased soreness in the past week. Was able to perform shoveling yesterday without any shoulder pain. Reports able to perform more tasks, feeling 70% improved.    O   11/30/22 0900   Signing Clinician's Name / Credentials   Signing clinician's name / credentials Maria Teresa Vo, PT, DPT   Session Number   Session Number 6/6   Progress Note/Recertification   Recertification Due Date 12/26/22   Adult Goals   PT Ortho Eval Goals 1;2;3;4   Ortho Goal 1   Goal Identifier HEP/Self managmemnent   Goal Description Patient will be independent in self-management of condition and HEP to reach functional goals.   Goal Progress In progress   Target Date 02/22/23   Ortho Goal 2   Goal Identifier IR   Goal Description Pt will be able to reach to level of T8 or higher with pain level <2/10 to allow for hooking undergarments to dress   Goal Progress In progress - able to unhook undergarment with 4/5 pain   Target Date 02/22/23   Ortho Goal 3   Goal Identifier ABD   Goal Description Pt will be able to abd arm to >90 with pain level <2/10 to allow for putting on seatbelt and cooking acitivits.   Goal Progress 120 deg with pain level 5/10 pain   Target Date 02/22/23   Ortho Goal 4   Goal Identifier Overhead   Goal Description Pt will be able to reach overhead >130 with pain level <2/10 to allow for putting items away on shelf in kitchen    Target Date 12/26/22   Date Met 11/16/22   Subjective Report   Subjective Report Pt arrives today reporting feeling discouraged, more pain with all motions though feels like ROM is improving. Continues to have persistent pain with abduction and IR up back. Feels like shoulder has stiffened up. Now feels like unable to touch base of bra strap. Reports manual therapy has been helpful. HEP daily, they are going well however increased soreness in the past week. Was able to perform shoveling yesterday without any shoulder pain. Reports able to perform more tasks, feeling 70% improved.   Objective Measures   Objective Measures Objective Measure 1;Objective Measure 2   Objective Measure 1   Objective Measure Shoulder AROM (10/17/22)   Details AROM flex: L 140, R 152, abd L 103, R 164, L 70, R 80, R T 9, L T12  (11/30/22) AROM flex: L 145, R 158, abd L 120, R 158, L 70, R 80, R T8, L T11 and reports achiness   Objective Measure 2   Objective Measure SPADI   Details 34% (10/3/22 eval)   Treatment Interventions   Interventions Therapeutic Procedure/Exercise;Manual Therapy   Therapeutic Procedure/exercise   Therapeutic Procedures: strength, endurance, ROM, flexibillity minutes (79884) 32   Skilled Intervention Updated HEP, Patient education, Verbal and tactile cues utilized to facilitate correct exercise technique   Patient Response Tolerated well, minor fatigue   Treatment Detail For increased shoulder ROM - UBE level 1 frwd/bkwd 3 mins. For improved shoulder strength and mobility - Scapular retraction x 10. Standing Raymond ER with L1 band x 12. Standing shoulder D2 and D1 flexion extension patterns x 5 B. Shoulder rolls x 10 B; improved pain. Low rows x 12, cues for lower trapezius activation. LUE IR with L2 band x 10, no pain. LUE ER with L2 band x 10, no pain. Standing wall crawl in arm abduction x 12, observed to be increased ROM with decreased discomfort. Subscapularis stretch LUE 30 sec.   Manual Therapy   Manual  Therapy: Mobilization, MFR, MLD, friction massage minutes (11933) 10   Skilled Intervention STM, scapular tilts, L shoulder distraction with IR   Patient Response Tolerated well, reports improved stiffness and ability to reach flexion and abduction shoulder improved   Treatment Detail For increased shoulder AROM and pain control - Supine shoulder mobilizations - inferior glides to improve abduction. R sidelying: shoulder mobilizations with IR. Scapular tilts, STM to periscapular muscles and upper trapezius. L shoulder distraction with IR.   Education   Learner Patient   Readiness Eager   Method Booklet/handout;Explanation;Demonstration;Video   Response Verbalizes Understanding   Plan   Homework PTRx (print)   Home program Wall slides with orage band, scap squeeze, PROM wand ext, PROM wand abd, passive shoulder flexion walk outs at counter, shoulder IR with towel, isometric IR, pec doorway stretch. Prioritize: low rows, IR/ER with band, upper trap stretch, towel IR, wall climb abduction. D2 flexion pattern, supscap stretch   Updates to plan of care Addedd additional visits and updated goal date   Plan for next session Progress strengthening and shoulder stabilization - abd with resistance, weighted scaption, wood chops, pulldowns   Comments   Comments Pt returns with overall improvement in shoulder AROM, overall very happy with progress. Continues to have persistent pain with far reaching into abduction and IR, though demonstrates improvements in ROM, feeling more discomfort with active range. Pt would benefit from additional couple visits with increased time between session to progress.   Total Session Time   Timed Code Treatment Minutes 39   Total Treatment Time (sum of timed and untimed services) 39   Medicare Claim Information   Medical Diagnosis Localized osteoarthritis of left shoulder/Tendinopathy of left rotator cuff/  Degenerative tear of glenoid labrum of left shoulder   PT Diagnosis L shoulder impingment           I CERTIFY THE NEED FOR THESE SERVICES FURNISHED UNDER        THIS PLAN OF TREATMENT AND WHILE UNDER MY CARE     (Physician co-signature of this document indicates review and certification of the therapy plan).              Referring Provider: Toy Chen DO JACQUELINE, VO, PT

## 2022-11-30 NOTE — ADDENDUM NOTE
Encounter addended by: Dinora Healy, PT on: 11/30/2022 11:34 AM   Actions taken: Document created, Document edited

## 2022-12-01 DIAGNOSIS — G47.09 OTHER INSOMNIA: ICD-10-CM

## 2022-12-02 NOTE — TELEPHONE ENCOUNTER
"Former patient of  & has not established care with another provider.  Please assign refill request to covering provider per clinic standard process.      Routing refill request to provider for review/approval because:  Patient needs to be seen because it has been more than 1 year since last office visit.    Last Written Prescription Date:  10/29/21  Last Fill Quantity: 90,  # refills: 3   Last office visit provider:  10/29/21     Requested Prescriptions   Pending Prescriptions Disp Refills     traZODone (DESYREL) 50 MG tablet [Pharmacy Med Name: TRAZODONE 50MG TABLETS] 90 tablet 3     Sig: TAKE 1 TABLET(50 MG) BY MOUTH EVERY NIGHT AS NEEDED FOR SLEEP       Serotonin Modulators Failed - 12/1/2022  1:00 PM        Failed - Recent (12 mo) or future (30 days) visit within the authorizing provider's specialty     Patient has had an office visit with the authorizing provider or a provider within the authorizing providers department within the previous 12 mos or has a future within next 30 days. See \"Patient Info\" tab in inbasket, or \"Choose Columns\" in Meds & Orders section of the refill encounter.              Passed - Medication is active on med list        Passed - Patient is age 18 or older        Passed - No active pregnancy on record        Passed - No positive pregnancy test in past 12 months             Marcia Camarena RN 12/02/22 2:29 PM  "

## 2022-12-07 RX ORDER — TRAZODONE HYDROCHLORIDE 50 MG/1
TABLET, FILM COATED ORAL
Qty: 90 TABLET | Refills: 3 | Status: SHIPPED | OUTPATIENT
Start: 2022-12-07

## 2022-12-14 NOTE — TELEPHONE ENCOUNTER
12/14 I called and talked to pt she would like to follow with Dr. Peter when she comes will call back later to schedule with her

## 2022-12-28 ENCOUNTER — HOSPITAL ENCOUNTER (OUTPATIENT)
Dept: PHYSICAL THERAPY | Facility: REHABILITATION | Age: 72
Discharge: HOME | End: 2022-12-28
Payer: COMMERCIAL

## 2022-12-28 PROCEDURE — 97110 THERAPEUTIC EXERCISES: CPT | Mod: GP

## 2022-12-28 PROCEDURE — 97140 MANUAL THERAPY 1/> REGIONS: CPT | Mod: GP

## 2023-01-11 ENCOUNTER — HOSPITAL ENCOUNTER (OUTPATIENT)
Dept: PHYSICAL THERAPY | Facility: REHABILITATION | Age: 73
Discharge: HOME | End: 2023-01-11
Payer: COMMERCIAL

## 2023-01-11 PROCEDURE — 97140 MANUAL THERAPY 1/> REGIONS: CPT | Mod: GP

## 2023-01-11 PROCEDURE — 97110 THERAPEUTIC EXERCISES: CPT | Mod: GP

## 2023-01-17 DIAGNOSIS — E78.5 HYPERLIPIDEMIA LDL GOAL <130: ICD-10-CM

## 2023-01-17 DIAGNOSIS — I10 HYPERTENSION, UNSPECIFIED TYPE: ICD-10-CM

## 2023-01-17 DIAGNOSIS — I10 HYPERTENSION: ICD-10-CM

## 2023-01-18 NOTE — TELEPHONE ENCOUNTER
"Former patient of  & has not established care with another provider.  Please assign refill request to covering provider per clinic standard process.    Routing refill request to provider for review/approval because:  Labs not current:  Multiple  Patient needs to be seen because it has been more than 1 year since last office visit.  BP not current    Last Written Prescription Date:  1/26/22  Last Fill Quantity: 90,  # refills: 3   Last office visit provider:  10/29/21    Last Written Prescription Date:  10/19/22  Last Fill Quantity: 90,  # refills: 0   Last office visit provider:  10/29/21     Requested Prescriptions   Pending Prescriptions Disp Refills     lisinopril (ZESTRIL) 10 MG tablet [Pharmacy Med Name: LISINOPRIL 10MG TABLETS] 90 tablet 3     Sig: TAKE 1 TABLET(10 MG) BY MOUTH DAILY       ACE Inhibitors (Including Combos) Protocol Failed - 1/17/2023  5:42 AM        Failed - Blood pressure under 140/90 in past 12 months     BP Readings from Last 3 Encounters:   10/29/21 110/70   05/25/21 110/60   02/20/20 (!) 148/84                 Failed - Recent (12 mo) or future (30 days) visit within the authorizing provider's specialty     Patient has had an office visit with the authorizing provider or a provider within the authorizing providers department within the previous 12 mos or has a future within next 30 days. See \"Patient Info\" tab in inbasket, or \"Choose Columns\" in Meds & Orders section of the refill encounter.              Failed - Normal serum creatinine on file in past 12 months     Recent Labs   Lab Test 10/29/21  1138   CR 0.86       Ok to refill medication if creatinine is low          Failed - Normal serum potassium on file in past 12 months     Recent Labs   Lab Test 10/29/21  1138   POTASSIUM 5.0             Passed - Medication is active on med list        Passed - Patient is age 18 or older        Passed - No active pregnancy on record        Passed - No positive pregnancy test within past 12 " "months           amLODIPine (NORVASC) 5 MG tablet [Pharmacy Med Name: AMLODIPINE BESYLATE 5MG TABLETS] 90 tablet 3     Sig: TAKE 1 TABLET(5 MG) BY MOUTH DAILY       Calcium Channel Blockers Protocol  Failed - 1/17/2023  5:42 AM        Failed - Blood pressure under 140/90 in past 12 months     BP Readings from Last 3 Encounters:   10/29/21 110/70   05/25/21 110/60   02/20/20 (!) 148/84                 Failed - Recent (12 mo) or future (30 days) visit within the authorizing provider's specialty     Patient has had an office visit with the authorizing provider or a provider within the authorizing providers department within the previous 12 mos or has a future within next 30 days. See \"Patient Info\" tab in inbasket, or \"Choose Columns\" in Meds & Orders section of the refill encounter.              Failed - Normal serum creatinine on file in past 12 months     Recent Labs   Lab Test 10/29/21  1138   CR 0.86       Ok to refill medication if creatinine is low          Passed - Medication is active on med list        Passed - Patient is age 18 or older        Passed - No active pregnancy on record        Passed - No positive pregnancy test in past 12 months           atorvastatin (LIPITOR) 20 MG tablet [Pharmacy Med Name: ATORVASTATIN 20MG TABLETS] 90 tablet 0     Sig: TAKE 1 TABLET(20 MG) BY MOUTH AT BEDTIME       Statins Protocol Failed - 1/17/2023  5:42 AM        Failed - LDL on file in past 12 months     Recent Labs   Lab Test 10/29/21  1138   LDL 88             Failed - Recent (12 mo) or future (30 days) visit within the authorizing provider's specialty     Patient has had an office visit with the authorizing provider or a provider within the authorizing providers department within the previous 12 mos or has a future within next 30 days. See \"Patient Info\" tab in inbasket, or \"Choose Columns\" in Meds & Orders section of the refill encounter.              Passed - No abnormal creatine kinase in past 12 months     No lab " results found.             Passed - Medication is active on med list        Passed - Patient is age 18 or older        Passed - No active pregnancy on record        Passed - No positive pregnancy test in past 12 months             Fidel Bruno RN 01/18/23 2:39 PM

## 2023-01-19 RX ORDER — ATORVASTATIN CALCIUM 20 MG/1
20 TABLET, FILM COATED ORAL AT BEDTIME
Qty: 90 TABLET | Refills: 0 | Status: SHIPPED | OUTPATIENT
Start: 2023-01-19

## 2023-01-19 RX ORDER — AMLODIPINE BESYLATE 5 MG/1
5 TABLET ORAL DAILY
Qty: 90 TABLET | Refills: 0 | Status: SHIPPED | OUTPATIENT
Start: 2023-01-19 | End: 2023-07-17

## 2023-01-19 RX ORDER — LISINOPRIL 10 MG/1
10 TABLET ORAL DAILY
Qty: 90 TABLET | Refills: 0 | Status: SHIPPED | OUTPATIENT
Start: 2023-01-19

## 2023-01-19 NOTE — TELEPHONE ENCOUNTER
1/19/2023@1:19  Called pt and pt said that she was coming in next week to see someone; after looking at pt appointment desk it appears pt is coming in but the appointment is not for establishing a new pcp; pt still needs to schedule establish care appointment.

## 2023-01-25 ENCOUNTER — HOSPITAL ENCOUNTER (OUTPATIENT)
Dept: PHYSICAL THERAPY | Facility: REHABILITATION | Age: 73
Discharge: HOME | End: 2023-01-25
Payer: COMMERCIAL

## 2023-01-25 PROCEDURE — 97110 THERAPEUTIC EXERCISES: CPT | Mod: GP

## 2023-01-25 PROCEDURE — 97140 MANUAL THERAPY 1/> REGIONS: CPT | Mod: GP

## 2023-01-25 NOTE — PROGRESS NOTES
[facility]    Outpatient Physical Therapy Discharge Note  Patient: Katey Singh  : 1950    Beginning/End Dates of Reporting Period:  10/3/23 to 23    Referring Provider: Toy Chen DO    Therapy Diagnosis: L shoulder pain      Client Self Report: Pt arrives today reporting feeling a little sore from janessa chi class. Otherwise fells like 78-80% improvement, reports IR is still difficulty otherwise very happy with progress and feels able to manage independently.       23 0900   Signing Clinician's Name / Credentials   Signing clinician's name / credentials Maria Teresa Healy, PT, DPT   Session Number   Session Number +4   Progress Note/Recertification   Recertification Due Date 22   Adult Goals   PT Ortho Eval Goals 1;2;3;4   Ortho Goal 1   Goal Identifier HEP/Self managmemnent   Goal Description Patient will be independent in self-management of condition and HEP to reach functional goals.   Target Date 23   Date Met 23   Ortho Goal 2   Goal Identifier IR   Goal Description Pt will be able to reach to level of T8 or higher with pain level <2/10 to allow for hooking undergarments to dress   Goal Progress In progress - able to unhook undergarment with 4/5 pain   Target Date 23   Ortho Goal 3   Goal Identifier ABD   Goal Description Pt will be able to abd arm to >90 with pain level <2/10 to allow for putting on seatbelt and cooking acitivits.   Goal Progress 120 deg with pain level 5/10 pain   Target Date 23   Date Met 23   Ortho Goal 4   Goal Identifier Overhead   Goal Description Pt will be able to reach overhead >130 with pain level <2/10 to allow for putting items away on shelf in kitchen   Target Date 22   Date Met 22   Subjective Report   Subjective Report Pt arrives today reporting feeling a little sore from janessa chi class. Otherwise fells  like 78-80% improvement, reports IR is still difficulty otherwise very happy with progress and feels able to manage independently.   Objective Measures   Objective Measures Objective Measure 1;Objective Measure 2   Objective Measure 1   Objective Measure Shoulder AROM (10/17/22)   Details AROM flex: L 140, R 152, abd L 103, R 164, L 70, R 80, R T 9, L T12  (11/30/22) AROM flex: L 145, R 158, abd L 120, R 158, L 70, R 80, R T8, L T11 and reports achiness. (1/25/23) Shoulder AROM flex: L 142, R 145. Reaching across opposite shoulder no pain. Abd: L 145, R 160. IR: L T7, R T8. Reports 4/10 stiffness with AROM.   Objective Measure 2   Objective Measure SPADI   Details 34% (10/3/22 eval) , 10% (1/25/23)   Treatment Interventions   Interventions Therapeutic Procedure/Exercise;Manual Therapy   Therapeutic Procedure/exercise   Therapeutic Procedures: strength, endurance, ROM, flexibillity minutes (28941) 25   Skilled Intervention Finalized HEP, Patient education, Verbal and tactile cues utilized to facilitate correct exercise technique   Patient Response Tolerated well, minor fatigue   Treatment Detail For improved scapular strength and ROM - wall climbs x 10, IR towel stretch x 8 LUE. Wand abduction x 10 LUE. Wand flexion x 10 LUE. Wand extension x 10 LUE. Multidirectional shoulder abduction and diagonals x 5 in each directions. Thread the needle x 10 B. Reviewed HEP and finalized.   Manual Therapy   Manual Therapy: Mobilization, MFR, MLD, friction massage minutes (46155) 13   Skilled Intervention STM, scapular tilts, L shoulder distraction with IR   Patient Response Tolerated well, reports improved stiffness and ability to reach flexion and abduction shoulder improved. Cavitation at thoracic   Treatment Detail For increased shoulder AROM and pain control - Supine shoulder mobilizations - inferior glides to improve abduction. R sidelying: shoulder mobilizations with IR. Scapular tilts, STM to periscapular muscles and upper  trapezius. L shoulder distraction with IR.   Education   Learner Patient   Readiness Eager   Method Booklet/handout;Explanation;Demonstration;Video   Response Verbalizes Understanding   Plan   Homework PTRx (print)   Home program Wall slides with orage band, scap squeeze, PROM wand ext, PROM wand abd, passive shoulder flexion walk outs at counter, shoulder IR with towel, isometric IR, pec doorway stretch. Prioritize: low rows, IR/ER with band, upper trap stretch, towel IR, wall climb abduction. D2 flexion pattern, supscap stretch. Shoulder flexion with L1 band, shoulder abduction L1 band, shoulder distraction, sleeper stretch.  wood chops and reverse woodchops, scaption with band   Plan for next session DC   Comments   Comments Pt returns with good improvement in L shoulder pain from inital eval. SPADI shows improvement and objective measures. Met all goals. At this time appropriate to DC and manage condition independently.   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    Discharge:    Reason for Discharge: Patient has met all goals.    Discharge Plan: Patient to continue home program.

## 2023-05-19 ENCOUNTER — LAB REQUISITION (OUTPATIENT)
Dept: LAB | Facility: CLINIC | Age: 73
End: 2023-05-19
Payer: COMMERCIAL

## 2023-05-19 DIAGNOSIS — Z12.4 ENCOUNTER FOR SCREENING FOR MALIGNANT NEOPLASM OF CERVIX: ICD-10-CM

## 2023-05-19 PROCEDURE — G0145 SCR C/V CYTO,THINLAYER,RESCR: HCPCS | Mod: ORL | Performed by: OBSTETRICS & GYNECOLOGY

## 2023-05-24 LAB
BKR LAB AP GYN ADEQUACY: NORMAL
BKR LAB AP GYN INTERPRETATION: NORMAL
BKR LAB AP HPV REFLEX: NORMAL
BKR LAB AP LMP: NORMAL
BKR LAB AP PREVIOUS ABNL DX: NORMAL
BKR LAB AP PREVIOUS ABNORMAL: NORMAL
PATH REPORT.COMMENTS IMP SPEC: NORMAL
PATH REPORT.COMMENTS IMP SPEC: NORMAL
PATH REPORT.RELEVANT HX SPEC: NORMAL

## 2023-06-22 ENCOUNTER — OFFICE VISIT (OUTPATIENT)
Dept: INTERNAL MEDICINE | Facility: CLINIC | Age: 73
End: 2023-06-22
Payer: COMMERCIAL

## 2023-06-22 VITALS
HEIGHT: 67 IN | HEART RATE: 76 BPM | TEMPERATURE: 98 F | WEIGHT: 142.8 LBS | SYSTOLIC BLOOD PRESSURE: 142 MMHG | BODY MASS INDEX: 22.41 KG/M2 | OXYGEN SATURATION: 98 % | RESPIRATION RATE: 11 BRPM | DIASTOLIC BLOOD PRESSURE: 83 MMHG

## 2023-06-22 DIAGNOSIS — J43.2 CENTRILOBULAR EMPHYSEMA (H): ICD-10-CM

## 2023-06-22 DIAGNOSIS — E55.9 VITAMIN D DEFICIENCY: ICD-10-CM

## 2023-06-22 DIAGNOSIS — Z78.0 POST-MENOPAUSAL: ICD-10-CM

## 2023-06-22 DIAGNOSIS — E78.5 HYPERLIPIDEMIA LDL GOAL <130: ICD-10-CM

## 2023-06-22 DIAGNOSIS — Z98.890 H/O PNEUMONECTOMY: ICD-10-CM

## 2023-06-22 DIAGNOSIS — I10 PRIMARY HYPERTENSION: Primary | ICD-10-CM

## 2023-06-22 DIAGNOSIS — Z90.2 H/O PNEUMONECTOMY: ICD-10-CM

## 2023-06-22 DIAGNOSIS — Z87.891 HISTORY OF SMOKING: ICD-10-CM

## 2023-06-22 PROCEDURE — 99213 OFFICE O/P EST LOW 20 MIN: CPT | Performed by: INTERNAL MEDICINE

## 2023-06-22 RX ORDER — ALBUTEROL SULFATE 90 UG/1
2 AEROSOL, METERED RESPIRATORY (INHALATION) EVERY 6 HOURS PRN
Qty: 18 G
Start: 2023-06-22

## 2023-06-22 ASSESSMENT — PAIN SCALES - GENERAL: PAINLEVEL: NO PAIN (0)

## 2023-06-22 NOTE — PATIENT INSTRUCTIONS
Get b/p cuff and monitor b/p and heart rate and let me know what numbers are . Goal b/p 100-130.    2. Alcohol can increase b/p

## 2023-06-22 NOTE — PROGRESS NOTES
Assessment & Plan     Primary hypertension  Blood pressure is slightly elevated in the office however she has been experiencing some dizziness at home.  This could be orthostatic versus vertigo.  She will continue on lisinopril 10 mg and amlodipine 5 mg at nighttime and will send me a message through [employer] with her home readings.  She did lose some weight, so certainly if her blood pressures at home are borderline low we could decrease her blood pressure medications.    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  She quit smoking in 2019,  - albuterol (PROAIR HFA/PROVENTIL HFA/VENTOLIN HFA) 108 (90 Base) MCG/ACT inhaler; Inhale 2 puffs into the lungs every 6 hours as needed for shortness of breath, wheezing or cough    History of smoking  She quit smoking in 2019, she does have 35 pack years of smoking and currently will continue with yearly low-dose CT scan screenings  - CT Chest Lung Cancer Scrn Low Dose wo; Future    Post-menopausal  - DX Hip/Pelvis/Spine; Future    Hyperlipidemia LDL goal <130  - Lipid panel reflex to direct LDL Fasting; Future    Vitamin D deficiency  She is currently taking 1500 units of vitamin D a day  - Vitamin D Deficiency; Future    MD [facility]RE[facility]    Rafa Del Toro is a 72 year old, presenting for the following health issues:  Establish Care (Establish care and discuss low blood pressure issues and reports dizziness. Patient is requesting  her CT as she has lost her primary care doctor.)        6/22/2023     2:04 PM   Additional Questions   Roomed by Tsering BOSWELL     History of Present Illness       Hypertension: She presents for follow up of hypertension.  She does not check blood pressure  regularly outside of the clinic. Outpatient blood pressures have not been over 140/90. She follows a low salt diet.     She eats 2-3 servings of fruits and vegetables daily.She  "consumes 0 sweetened beverage(s) daily.She exercises with enough effort to increase her heart rate 10 to 19 minutes per day.  She exercises with enough effort to increase her heart rate 5 days per week.   She is taking medications regularly.     Is a 72-year-old female with history of hypertension, hyperlipidemia migraines, lung granuloma and previous lung resection insomnia, hormone replacement treatment who is currently here to meet me and discuss several concerns.    She has had intermittent dizziness and lightheadedness.  It often happens when she is sitting down reading and then stands up.  Sometimes it happens when she is walking around, yesterday she felt unsteady and the whole day.  She feels woozy.  She does not have clear-cut vertigo.  At times symptoms associated with mild shortness of breath but she denies any palpitations or chest pains.  She is on lisinopril 10 mg and Norvasc 5 mg which she takes in the evening.    She did lose some weight.  Currently she is not checking blood pressures at home.    She has had previous lung nodule which was resected and ended up being granuloma but she went to a lot of stress during that.  Because she said she had a lung cancer in 2019.  Since then she quit smoking and currently is on yearly low-dose CT scan screenings due to history of 25 pack years of smoking.  She has albuterol which she uses as needed and denies any persistent symptoms.    Review of Systems   As above      Objective    BP (!) 142/83   Pulse 76   Temp 98  F (36.7  C) (Tympanic)   Resp 11   Ht 1.71 m (5' 7.32\")   Wt 64.8 kg (142 lb 12.8 oz)   LMP  (LMP Unknown)   SpO2 98%   BMI 22.15 kg/m    Body mass index is 22.15 kg/m .  Physical Exam   General: well appearing female, alert and oriented x3  EYES: Eyelids, conjunctiva, and sclera were normal. Pupils were normal.   HEAD, EARS, NOSE, MOUTH, AND THROAT: no cervical LAD, no thyromegaly or nodules appreciated. TMs are visualized and normal, " oropharynx is clear.  RESPIRATORY: respirations non labored, CTA bl, no wheezes, rales, no forced expiratory wheezing.  CARDIOVASCULAR: Heart rate and rhythm were normal. No murmurs, rubs,gallops. There was no peripheral edema.   GASTROINTESTINAL: Positive bowel sounds, abdomen is soft, non tender, non distended.     MUSCULOSKELETAL: Muscle mass was normal for age. No joint synovitis or deformity.  LYMPHATIC: There were no enlarged nodes palpable.  SKIN/HAIR/NAILS: Skin color was normal.  No rashes.  NEUROLOGIC: The patient was alert and oriented.  Speech was normal.  There is no facial asymmetry.   PSYCHIATRIC:  Mood and affect were normal.

## 2023-07-05 ENCOUNTER — HOSPITAL ENCOUNTER (OUTPATIENT)
Dept: CT IMAGING | Facility: HOSPITAL | Age: 73
Discharge: HOME | End: 2023-07-05
Attending: INTERNAL MEDICINE | Admitting: INTERNAL MEDICINE
Payer: COMMERCIAL

## 2023-07-05 DIAGNOSIS — Z87.891 HISTORY OF SMOKING: ICD-10-CM

## 2023-07-05 PROCEDURE — 71271 CT THORAX LUNG CANCER SCR C-: CPT

## 2023-07-16 DIAGNOSIS — I10 HYPERTENSION: ICD-10-CM

## 2023-07-16 NOTE — TELEPHONE ENCOUNTER
"Former patient of  & has not established care with another provider.  Please assign refill request to covering provider per clinic standard process.    Routing refill request to provider for review/approval because:  Labs not current:  CR  B/P out of range    Last Written Prescription Date:  1/19/23  Last Fill Quantity: 90,  # refills: 0   Last office visit provider:  6/22/23     Requested Prescriptions   Pending Prescriptions Disp Refills     amLODIPine (NORVASC) 5 MG tablet [Pharmacy Med Name: AMLODIPINE BESYLATE 5MG TABLETS] 90 tablet 0     Sig: TAKE 1 TABLET(5 MG) BY MOUTH DAILY       Calcium Channel Blockers Protocol  Failed - 7/16/2023  7:52 AM        Failed - Blood pressure under 140/90 in past 12 months     BP Readings from Last 3 Encounters:   06/22/23 (!) 142/83   10/29/21 110/70   05/25/21 110/60                 Failed - Recent (12 mo) or future (30 days) visit within the authorizing provider's specialty     Patient has had an office visit with the authorizing provider or a provider within the authorizing providers department within the previous 12 mos or has a future within next 30 days. See \"Patient Info\" tab in inbasket, or \"Choose Columns\" in Meds & Orders section of the refill encounter.              Failed - Normal serum creatinine on file in past 12 months     Recent Labs   Lab Test 10/29/21  1138   CR 0.86       Ok to refill medication if creatinine is low          Passed - Medication is active on med list        Passed - Patient is age 18 or older        Passed - No active pregnancy on record        Passed - No positive pregnancy test in past 12 months             Marilu Kam RN 07/16/23 7:53 AM  "

## 2023-07-17 RX ORDER — AMLODIPINE BESYLATE 5 MG/1
TABLET ORAL
Qty: 90 TABLET | Refills: 3 | Status: SHIPPED | OUTPATIENT
Start: 2023-07-17

## 2023-07-17 NOTE — TELEPHONE ENCOUNTER
Patient called back and yes she is taking medication as prescribed. Patient only has her BP from 2 1/2 weeks ago which was 114/70.

## 2023-07-17 NOTE — TELEPHONE ENCOUNTER
Per office visit 06/22/23 (established care with Dr. Emery),    Primary hypertension  Blood pressure is slightly elevated in the office however she has been experiencing some dizziness at home.  This could be orthostatic versus vertigo.  She will continue on lisinopril 10 mg and amlodipine 5 mg at nighttime and will send me a message through [employer] with her home readings.  She did lose some weight, so certainly if her blood pressures at home are borderline low we could decrease her blood pressure medications.    Attempted to contact patient, no answer. Left message to return call to clinic for follow-up.    Does patient have BP readings to provide for PCP review? Lisinopril and Norvasc were refilled in January for a 90 day supply. Is patient taking medication currently as prescribed?

## 2024-02-25 ENCOUNTER — HEALTH MAINTENANCE LETTER (OUTPATIENT)
Age: 74
End: 2024-02-25

## 2024-05-05 ENCOUNTER — HEALTH MAINTENANCE LETTER (OUTPATIENT)
Age: 74
End: 2024-05-05

## 2024-07-22 ENCOUNTER — HOSPITAL ENCOUNTER (OUTPATIENT)
Dept: CT IMAGING | Facility: HOSPITAL | Age: 74
Discharge: HOME | End: 2024-07-22
Attending: FAMILY MEDICINE | Admitting: FAMILY MEDICINE
Payer: COMMERCIAL

## 2024-07-22 DIAGNOSIS — Z87.891 HISTORY OF TOBACCO USE: ICD-10-CM

## 2024-07-22 PROCEDURE — 71271 CT THORAX LUNG CANCER SCR C-: CPT

## 2025-03-15 ENCOUNTER — HEALTH MAINTENANCE LETTER (OUTPATIENT)
Age: 75
End: 2025-03-15

## 2025-03-25 ENCOUNTER — TRANSFERRED RECORDS (OUTPATIENT)
Dept: HEALTH INFORMATION MANAGEMENT | Facility: CLINIC | Age: 75
End: 2025-03-25
Payer: COMMERCIAL

## 2025-04-23 NOTE — TELEPHONE ENCOUNTER
Katey called to let Dr. Meyer know she would like to try an Inhaler like they talked about at her last appointment. Will update Dr. Meyer.  
[Smokers in Household] : there are no smokers in the home
[Smokers in Household] : there are no smokers in the home

## 2025-05-17 ENCOUNTER — HEALTH MAINTENANCE LETTER (OUTPATIENT)
Age: 75
End: 2025-05-17